# Patient Record
Sex: MALE | Race: BLACK OR AFRICAN AMERICAN | NOT HISPANIC OR LATINO | Employment: PART TIME | URBAN - METROPOLITAN AREA
[De-identification: names, ages, dates, MRNs, and addresses within clinical notes are randomized per-mention and may not be internally consistent; named-entity substitution may affect disease eponyms.]

---

## 2017-02-07 ENCOUNTER — HISTORICAL (OUTPATIENT)
Dept: RADIOLOGY | Facility: HOSPITAL | Age: 52
End: 2017-02-07

## 2017-05-09 ENCOUNTER — HISTORICAL (OUTPATIENT)
Dept: ADMINISTRATIVE | Facility: HOSPITAL | Age: 52
End: 2017-05-09

## 2017-05-09 LAB
ABS NEUT (OLG): 6.25 X10(3)/MCL (ref 2.1–9.2)
ABS NEUT (OLG): 6.4 X10(3)/MCL (ref 2.1–9.2)
ALBUMIN SERPL-MCNC: 4.7 GM/DL (ref 3.4–5)
ALBUMIN/GLOB SERPL: 1 {RATIO}
ALP SERPL-CCNC: 72 UNIT/L (ref 20–120)
ALT SERPL-CCNC: 13 UNIT/L
AST SERPL-CCNC: 18 UNIT/L
BASOPHILS # BLD AUTO: 0.04 X10(3)/MCL
BASOPHILS # BLD AUTO: 0.04 X10(3)/MCL
BASOPHILS NFR BLD AUTO: 0 % (ref 0–1)
BASOPHILS NFR BLD AUTO: 0 % (ref 0–1)
BILIRUB SERPL-MCNC: 0.8 MG/DL
BILIRUBIN DIRECT+TOT PNL SERPL-MCNC: <0.1 MG/DL
BILIRUBIN DIRECT+TOT PNL SERPL-MCNC: >0.7 MG/DL
BUN SERPL-MCNC: 41 MG/DL (ref 7–25)
CALCIUM SERPL-MCNC: 9.6 MG/DL (ref 8.4–10.3)
CD3+CD4+ CELLS # SPEC: 283 UNIT/L (ref 589–1505)
CD3+CD4+ CELLS NFR BLD: 11.8 % (ref 31–59)
CHLORIDE SERPL-SCNC: 106 MMOL/L (ref 96–110)
CO2 SERPL-SCNC: 21 MMOL/L (ref 24–32)
CREAT SERPL-MCNC: 3.02 MG/DL (ref 0.7–1.4)
EOSINOPHIL # BLD AUTO: 0.22 10*3/UL
EOSINOPHIL # BLD AUTO: 0.23 X10(3)/MCL
EOSINOPHIL NFR BLD AUTO: 2 % (ref 0–5)
EOSINOPHIL NFR BLD AUTO: 2 % (ref 0–5)
ERYTHROCYTE [DISTWIDTH] IN BLOOD BY AUTOMATED COUNT: 15 % (ref 11.5–14.5)
ERYTHROCYTE [DISTWIDTH] IN BLOOD BY AUTOMATED COUNT: 15 % (ref 11.5–14.5)
GLOBULIN SER-MCNC: 4 GM/ML (ref 2.3–3.5)
GLUCOSE SERPL-MCNC: 88 MG/DL (ref 65–99)
HCT VFR BLD AUTO: 34 % (ref 40–51)
HCT VFR BLD AUTO: 34.1 % (ref 40–51)
HGB BLD-MCNC: 10.6 GM/DL (ref 13.5–17.5)
HGB BLD-MCNC: 10.9 GM/DL (ref 13.5–17.5)
IMM GRANULOCYTES # BLD AUTO: 0.02 10*3/UL
IMM GRANULOCYTES # BLD AUTO: 0.04 10*3/UL
IMM GRANULOCYTES NFR BLD AUTO: 0 %
IMM GRANULOCYTES NFR BLD AUTO: 0 %
LYMPHOCYTES # BLD AUTO: 2.38 X10(3)/MCL
LYMPHOCYTES # BLD AUTO: 2.43 X10(3)/MCL
LYMPHOCYTES # BLD AUTO: 2400 UNIT/L (ref 1260–5520)
LYMPHOCYTES NFR BLD AUTO: 24 % (ref 15–40)
LYMPHOCYTES NFR BLD AUTO: 24 % (ref 15–40)
LYMPHOCYTES NFR LN MANUAL: 24 % (ref 28–48)
LYMPHOMA - T-CELL MARKERS SPEC-IMP: ABNORMAL
MCH RBC QN AUTO: 25.9 PG (ref 26–34)
MCH RBC QN AUTO: 26.3 PG (ref 26–34)
MCHC RBC AUTO-ENTMCNC: 31.2 GM/DL (ref 31–37)
MCHC RBC AUTO-ENTMCNC: 32 GM/DL (ref 31–37)
MCV RBC AUTO: 82.4 FL (ref 80–100)
MCV RBC AUTO: 82.9 FL (ref 80–100)
MONOCYTES # BLD AUTO: 0.92 X10(3)/MCL
MONOCYTES # BLD AUTO: 0.93 X10(3)/MCL
MONOCYTES NFR BLD AUTO: 9 % (ref 4–12)
MONOCYTES NFR BLD AUTO: 9 % (ref 4–12)
NEUTROPHILS # BLD AUTO: 6.25 X10(3)/MCL
NEUTROPHILS # BLD AUTO: 6.4 X10(3)/MCL
NEUTROPHILS NFR BLD AUTO: 64 X10(3)/MCL
NEUTROPHILS NFR BLD AUTO: 64 X10(3)/MCL
PLATELET # BLD AUTO: 265 X10(3)/MCL (ref 130–400)
PLATELET # BLD AUTO: 284 X10(3)/MCL (ref 130–400)
PMV BLD AUTO: 10.6 FL (ref 7.4–10.4)
PMV BLD AUTO: 10.8 FL (ref 7.4–10.4)
POTASSIUM SERPL-SCNC: 4.4 MMOL/L (ref 3.6–5.2)
PROT SERPL-MCNC: 8.7 GM/DL (ref 6–8)
RBC # BLD AUTO: 4.1 X10(6)/MCL (ref 4.5–5.9)
RBC # BLD AUTO: 4.14 X10(6)/MCL (ref 4.5–5.9)
SODIUM SERPL-SCNC: 135 MMOL/L (ref 135–146)
WBC # BLD AUTO: ABNORMAL /MM3 (ref 4500–11500)
WBC # SPEC AUTO: 10 X10(3)/MCL (ref 4.5–11)
WBC # SPEC AUTO: 9.9 X10(3)/MCL (ref 4.5–11)

## 2017-12-20 ENCOUNTER — HISTORICAL (OUTPATIENT)
Dept: ADMINISTRATIVE | Facility: HOSPITAL | Age: 52
End: 2017-12-20

## 2017-12-20 LAB
ABS NEUT (OLG): 4.45 X10(3)/MCL (ref 2.1–9.2)
ABS NEUT (OLG): 4.45 X10(3)/MCL (ref 2.1–9.2)
ALBUMIN SERPL-MCNC: 4 GM/DL (ref 3.4–5)
ALBUMIN/GLOB SERPL: 1 RATIO (ref 1–2)
ALP SERPL-CCNC: 101 UNIT/L (ref 45–117)
ALT SERPL-CCNC: 20 UNIT/L (ref 12–78)
APPEARANCE, UA: CLEAR
AST SERPL-CCNC: 15 UNIT/L (ref 15–37)
BACTERIA #/AREA URNS AUTO: ABNORMAL /[HPF]
BASOPHILS # BLD AUTO: 0.02 X10(3)/MCL
BASOPHILS # BLD AUTO: 0.03 X10(3)/MCL
BASOPHILS NFR BLD AUTO: 0 % (ref 0–1)
BASOPHILS NFR BLD AUTO: 0 % (ref 0–1)
BILIRUB SERPL-MCNC: 0.3 MG/DL (ref 0.2–1)
BILIRUB UR QL STRIP: NEGATIVE
BILIRUBIN DIRECT+TOT PNL SERPL-MCNC: 0.1 MG/DL
BILIRUBIN DIRECT+TOT PNL SERPL-MCNC: 0.2 MG/DL
BUN SERPL-MCNC: 56 MG/DL (ref 7–18)
CALCIUM SERPL-MCNC: 9.3 MG/DL (ref 8.5–10.1)
CD3+CD4+ CELLS # SPEC: 318 UNIT/L (ref 589–1505)
CD3+CD4+ CELLS NFR BLD: 11.6 % (ref 31–59)
CHLORIDE SERPL-SCNC: 107 MMOL/L (ref 98–107)
CHOLEST SERPL-MCNC: 165 MG/DL
CHOLEST/HDLC SERPL: 4.1 {RATIO} (ref 0–5)
CO2 SERPL-SCNC: 23 MMOL/L (ref 21–32)
COLOR UR: YELLOW
CREAT SERPL-MCNC: 3 MG/DL (ref 0.6–1.3)
CREAT UR-MCNC: 275 MG/DL
DEPRECATED CALCIDIOL+CALCIFEROL SERPL-MC: 8.76 NG/ML (ref 30–80)
EOSINOPHIL # BLD AUTO: 0.2 10*3/UL
EOSINOPHIL # BLD AUTO: 0.24 X10(3)/MCL
EOSINOPHIL NFR BLD AUTO: 2 % (ref 0–5)
EOSINOPHIL NFR BLD AUTO: 3 % (ref 0–5)
ERYTHROCYTE [DISTWIDTH] IN BLOOD BY AUTOMATED COUNT: 15.7 % (ref 11.5–14.5)
ERYTHROCYTE [DISTWIDTH] IN BLOOD BY AUTOMATED COUNT: 15.7 % (ref 11.5–14.5)
EST. AVERAGE GLUCOSE BLD GHB EST-MCNC: 131 MG/DL
GLOBULIN SER-MCNC: 5.1 GM/ML (ref 2.3–3.5)
GLUCOSE (UA): NORMAL
GLUCOSE SERPL-MCNC: 109 MG/DL (ref 74–106)
HBA1C MFR BLD: 6.2 % (ref 4.2–6.3)
HCT VFR BLD AUTO: 41.1 % (ref 40–51)
HCT VFR BLD AUTO: 41.2 % (ref 40–51)
HDLC SERPL-MCNC: 40 MG/DL
HGB BLD-MCNC: 13.2 GM/DL (ref 13.5–17.5)
HGB BLD-MCNC: 13.3 GM/DL (ref 13.5–17.5)
HGB UR QL STRIP: NEGATIVE
HYALINE CASTS #/AREA URNS LPF: ABNORMAL /[LPF]
IMM GRANULOCYTES # BLD AUTO: 0.03 10*3/UL
IMM GRANULOCYTES # BLD AUTO: 0.04 10*3/UL
IMM GRANULOCYTES NFR BLD AUTO: 0 %
IMM GRANULOCYTES NFR BLD AUTO: 0 %
KETONES UR QL STRIP: NEGATIVE
LDLC SERPL CALC-MCNC: 73 MG/DL (ref 0–130)
LEUKOCYTE ESTERASE UR QL STRIP: 25 LEU/UL
LYMPHOCYTES # BLD AUTO: 2.76 X10(3)/MCL
LYMPHOCYTES # BLD AUTO: 2.81 X10(3)/MCL
LYMPHOCYTES # BLD AUTO: 2739 UNIT/L (ref 1260–5520)
LYMPHOCYTES NFR BLD AUTO: 33 % (ref 15–40)
LYMPHOCYTES NFR BLD AUTO: 34 % (ref 15–40)
LYMPHOCYTES NFR LN MANUAL: 33 % (ref 28–48)
LYMPHOMA - T-CELL MARKERS SPEC-IMP: ABNORMAL
MCH RBC QN AUTO: 24.3 PG (ref 26–34)
MCH RBC QN AUTO: 24.4 PG (ref 26–34)
MCHC RBC AUTO-ENTMCNC: 32 GM/DL (ref 31–37)
MCHC RBC AUTO-ENTMCNC: 32.4 GM/DL (ref 31–37)
MCV RBC AUTO: 75.4 FL (ref 80–100)
MCV RBC AUTO: 75.7 FL (ref 80–100)
MICROALBUMIN UR-MCNC: 1280 MG/L (ref 0–19)
MICROALBUMIN/CREAT RATIO PNL UR: 465.5 MCG/MG CR (ref 0–29)
MONOCYTES # BLD AUTO: 0.77 X10(3)/MCL
MONOCYTES # BLD AUTO: 0.82 X10(3)/MCL
MONOCYTES NFR BLD AUTO: 10 % (ref 4–12)
MONOCYTES NFR BLD AUTO: 9 % (ref 4–12)
MUCOUS THREADS URNS QL MICRO: ABNORMAL
NEG CONT SPOT COUNT: 0
NEUTROPHILS # BLD AUTO: 4.45 X10(3)/MCL
NEUTROPHILS # BLD AUTO: 4.45 X10(3)/MCL
NEUTROPHILS NFR BLD AUTO: 53 X10(3)/MCL
NEUTROPHILS NFR BLD AUTO: 54 X10(3)/MCL
NITRITE UR QL STRIP: NEGATIVE
PANEL A SPOT COUNT: 1
PANEL B SPOT COUNT: 0
PH UR STRIP: 5.5 [PH] (ref 4.5–8)
PLATELET # BLD AUTO: 289 X10(3)/MCL (ref 130–400)
PLATELET # BLD AUTO: 298 X10(3)/MCL (ref 130–400)
PMV BLD AUTO: 10.3 FL (ref 7.4–10.4)
PMV BLD AUTO: 11.4 FL (ref 7.4–10.4)
POS CONT SPOT COUNT: >20
POTASSIUM SERPL-SCNC: 5 MMOL/L (ref 3.5–5.1)
PROT SERPL-MCNC: 9.1 GM/DL (ref 6.4–8.2)
PROT UR QL STRIP: 300 MG/DL
RBC # BLD AUTO: 5.44 X10(6)/MCL (ref 4.5–5.9)
RBC # BLD AUTO: 5.45 X10(6)/MCL (ref 4.5–5.9)
RBC #/AREA URNS AUTO: ABNORMAL /[HPF]
RPR SER QL: NORMAL
SODIUM SERPL-SCNC: 139 MMOL/L (ref 136–145)
SP GR UR STRIP: 1.01 (ref 1–1.03)
SQUAMOUS #/AREA URNS LPF: ABNORMAL /[LPF]
T PALLIDUM AB SER QL: REACTIVE
T-SPOT.TB: NEGATIVE
TRIGL SERPL-MCNC: 259 MG/DL
TSH SERPL-ACNC: 0.69 MIU/L (ref 0.36–3.74)
UROBILINOGEN UR STRIP-ACNC: NORMAL
VLDLC SERPL CALC-MCNC: 52 MG/DL
WBC # BLD AUTO: 8300 /MM3 (ref 4500–11500)
WBC # SPEC AUTO: 8.3 X10(3)/MCL (ref 4.5–11)
WBC # SPEC AUTO: 8.3 X10(3)/MCL (ref 4.5–11)
WBC #/AREA URNS AUTO: ABNORMAL /HPF

## 2018-08-07 ENCOUNTER — HISTORICAL (OUTPATIENT)
Dept: ADMINISTRATIVE | Facility: HOSPITAL | Age: 53
End: 2018-08-07

## 2018-08-07 LAB
ABS NEUT (OLG): 3.2 X10(3)/MCL (ref 2.1–9.2)
ALBUMIN SERPL-MCNC: 4.1 GM/DL (ref 3.4–5)
ALBUMIN/GLOB SERPL: 1 RATIO (ref 1–2)
ALP SERPL-CCNC: 100 UNIT/L (ref 45–117)
ALT SERPL-CCNC: 20 UNIT/L (ref 12–78)
APPEARANCE, UA: CLEAR
AST SERPL-CCNC: 16 UNIT/L (ref 15–37)
BACTERIA #/AREA URNS AUTO: ABNORMAL /[HPF]
BASOPHILS # BLD AUTO: 0.03 X10(3)/MCL
BASOPHILS NFR BLD AUTO: 0 %
BILIRUB SERPL-MCNC: 0.3 MG/DL (ref 0.2–1)
BILIRUB UR QL STRIP: NEGATIVE
BILIRUBIN DIRECT+TOT PNL SERPL-MCNC: <0.1 MG/DL
BILIRUBIN DIRECT+TOT PNL SERPL-MCNC: >0.2 MG/DL
BUN SERPL-MCNC: 34 MG/DL (ref 7–18)
CALCIUM SERPL-MCNC: 9.1 MG/DL (ref 8.5–10.1)
CD3+CD4+ CELLS # SPEC: 277 UNIT/L (ref 589–1505)
CD3+CD4+ CELLS NFR BLD: 14.9 % (ref 31–59)
CHLORIDE SERPL-SCNC: 109 MMOL/L (ref 98–107)
CHOLEST SERPL-MCNC: 135 MG/DL
CHOLEST/HDLC SERPL: 4.5 {RATIO} (ref 0–5)
CO2 SERPL-SCNC: 26 MMOL/L (ref 21–32)
COLOR UR: YELLOW
CREAT SERPL-MCNC: 2.7 MG/DL (ref 0.6–1.3)
CREAT UR-MCNC: 226 MG/DL
DEPRECATED CALCIDIOL+CALCIFEROL SERPL-MC: 16.11 NG/ML (ref 30–80)
EOSINOPHIL # BLD AUTO: 0.2 X10(3)/MCL
EOSINOPHIL NFR BLD AUTO: 3 %
ERYTHROCYTE [DISTWIDTH] IN BLOOD BY AUTOMATED COUNT: 15.9 % (ref 11.5–14.5)
EST. AVERAGE GLUCOSE BLD GHB EST-MCNC: 140 MG/DL
GLOBULIN SER-MCNC: 4.4 GM/ML (ref 2.3–3.5)
GLUCOSE (UA): NORMAL
GLUCOSE SERPL-MCNC: 102 MG/DL (ref 74–106)
HBA1C MFR BLD: 6.5 % (ref 4.2–6.3)
HCT VFR BLD AUTO: 38.6 % (ref 40–51)
HDLC SERPL-MCNC: 30 MG/DL
HGB BLD-MCNC: 11.7 GM/DL (ref 13.5–17.5)
HGB UR QL STRIP: 0.06 MG/DL
HYALINE CASTS #/AREA URNS LPF: ABNORMAL /[LPF]
IMM GRANULOCYTES # BLD AUTO: 0.03 10*3/UL
IMM GRANULOCYTES NFR BLD AUTO: 0 %
KETONES UR QL STRIP: NEGATIVE
LDLC SERPL CALC-MCNC: 70 MG/DL (ref 0–130)
LEUKOCYTE ESTERASE UR QL STRIP: 75 LEU/UL
LYMPHOCYTES # BLD AUTO: 1.85 X10(3)/MCL
LYMPHOCYTES # BLD AUTO: 1860 UNIT/L (ref 1260–5520)
LYMPHOCYTES NFR BLD AUTO: 31 % (ref 13–40)
LYMPHOCYTES NFR LN MANUAL: 31 % (ref 28–48)
LYMPHOMA - T-CELL MARKERS SPEC-IMP: ABNORMAL
MCH RBC QN AUTO: 23.3 PG (ref 26–34)
MCHC RBC AUTO-ENTMCNC: 30.3 GM/DL (ref 31–37)
MCV RBC AUTO: 76.7 FL (ref 80–100)
MICROALBUMIN UR-MCNC: 560 MG/L (ref 0–19)
MICROALBUMIN/CREAT RATIO PNL UR: 247.8 MCG/MG CR (ref 0–29)
MONOCYTES # BLD AUTO: 0.7 X10(3)/MCL
MONOCYTES NFR BLD AUTO: 12 % (ref 4–12)
MUCOUS THREADS URNS QL MICRO: ABNORMAL
NEG CONT SPOT COUNT: NORMAL
NEUTROPHILS # BLD AUTO: 3.2 X10(3)/MCL
NEUTROPHILS NFR BLD AUTO: 53 X10(3)/MCL
NITRITE UR QL STRIP: NEGATIVE
PANEL A SPOT COUNT: 0
PANEL B SPOT COUNT: 0
PH UR STRIP: 5.5 [PH] (ref 4.5–8)
PLATELET # BLD AUTO: 194 X10(3)/MCL (ref 130–400)
PMV BLD AUTO: 10.8 FL (ref 7.4–10.4)
POS CONT SPOT COUNT: NORMAL
POTASSIUM SERPL-SCNC: 4.7 MMOL/L (ref 3.5–5.1)
PROT SERPL-MCNC: 8.5 GM/DL (ref 6.4–8.2)
PROT UR QL STRIP: 70 MG/DL
RBC # BLD AUTO: 5.03 X10(6)/MCL (ref 4.5–5.9)
RBC #/AREA URNS AUTO: ABNORMAL /[HPF]
RPR SER QL: REACTIVE
SODIUM SERPL-SCNC: 141 MMOL/L (ref 136–145)
SP GR UR STRIP: 1.02 (ref 1–1.03)
SQUAMOUS #/AREA URNS LPF: ABNORMAL /[LPF]
T PALLIDUM AB SER QL: REACTIVE
T-SPOT.TB: NEGATIVE
TRIGL SERPL-MCNC: 176 MG/DL
TSH SERPL-ACNC: 1.97 MIU/L (ref 0.36–3.74)
UROBILINOGEN UR STRIP-ACNC: NORMAL
VLDLC SERPL CALC-MCNC: 35 MG/DL
WBC # BLD AUTO: 6000 /MM3 (ref 4500–11500)
WBC # SPEC AUTO: 6 X10(3)/MCL (ref 4.5–11)
WBC #/AREA URNS AUTO: ABNORMAL /HPF

## 2018-09-28 ENCOUNTER — HISTORICAL (OUTPATIENT)
Dept: RADIOLOGY | Facility: HOSPITAL | Age: 53
End: 2018-09-28

## 2018-10-10 ENCOUNTER — HISTORICAL (OUTPATIENT)
Dept: ADMINISTRATIVE | Facility: HOSPITAL | Age: 53
End: 2018-10-10

## 2018-10-10 LAB
ABS NEUT (OLG): 5.46 X10(3)/MCL (ref 2.1–9.2)
ALBUMIN SERPL-MCNC: 4.3 GM/DL (ref 3.4–5)
ALBUMIN/GLOB SERPL: 1 RATIO (ref 1–2)
ALP SERPL-CCNC: 87 UNIT/L (ref 45–117)
ALT SERPL-CCNC: 25 UNIT/L (ref 12–78)
APPEARANCE, UA: CLEAR
AST SERPL-CCNC: 19 UNIT/L (ref 15–37)
BACTERIA #/AREA URNS AUTO: ABNORMAL /[HPF]
BASOPHILS # BLD AUTO: 0.05 X10(3)/MCL
BASOPHILS NFR BLD AUTO: 1 %
BILIRUB SERPL-MCNC: 0.2 MG/DL (ref 0.2–1)
BILIRUB UR QL STRIP: NEGATIVE
BILIRUBIN DIRECT+TOT PNL SERPL-MCNC: <0.1 MG/DL
BILIRUBIN DIRECT+TOT PNL SERPL-MCNC: ABNORMAL MG/DL
BUN SERPL-MCNC: 48 MG/DL (ref 7–18)
CALCIUM SERPL-MCNC: 9.3 MG/DL (ref 8.5–10.1)
CHLORIDE SERPL-SCNC: 109 MMOL/L (ref 98–107)
CO2 SERPL-SCNC: 23 MMOL/L (ref 21–32)
COLOR UR: ABNORMAL
CREAT SERPL-MCNC: 3.7 MG/DL (ref 0.6–1.3)
CREAT UR-MCNC: 193 MG/DL
DEPRECATED CALCIDIOL+CALCIFEROL SERPL-MC: 37.24 NG/ML (ref 30–80)
EOSINOPHIL # BLD AUTO: 0.18 10*3/UL
EOSINOPHIL NFR BLD AUTO: 2 %
ERYTHROCYTE [DISTWIDTH] IN BLOOD BY AUTOMATED COUNT: 14.6 % (ref 11.5–14.5)
FERRITIN SERPL-MCNC: 151.1 NG/ML (ref 26–388)
GLOBULIN SER-MCNC: 4.4 GM/ML (ref 2.3–3.5)
GLUCOSE (UA): NORMAL
GLUCOSE SERPL-MCNC: 110 MG/DL (ref 74–106)
HCT VFR BLD AUTO: 34 % (ref 40–51)
HGB BLD-MCNC: 10.4 GM/DL (ref 13.5–17.5)
HGB UR QL STRIP: NEGATIVE
HYALINE CASTS #/AREA URNS LPF: ABNORMAL /[LPF]
IMM GRANULOCYTES # BLD AUTO: 0.03 10*3/UL
IMM GRANULOCYTES NFR BLD AUTO: 0 %
IRON SATN MFR SERPL: 24.8 % (ref 15–50)
IRON SERPL-MCNC: 71 MCG/DL (ref 65–175)
KETONES UR QL STRIP: NEGATIVE
LEUKOCYTE ESTERASE UR QL STRIP: 250 LEU/UL
LYMPHOCYTES # BLD AUTO: 2.49 X10(3)/MCL
LYMPHOCYTES NFR BLD AUTO: 28 % (ref 13–40)
MAGNESIUM SERPL-MCNC: 2.4 MG/DL (ref 1.8–2.4)
MCH RBC QN AUTO: 23.1 PG (ref 26–34)
MCHC RBC AUTO-ENTMCNC: 30.6 GM/DL (ref 31–37)
MCV RBC AUTO: 75.6 FL (ref 80–100)
MONOCYTES # BLD AUTO: 0.8 X10(3)/MCL
MONOCYTES NFR BLD AUTO: 9 % (ref 4–12)
NEUTROPHILS # BLD AUTO: 5.46 X10(3)/MCL
NEUTROPHILS NFR BLD AUTO: 61 X10(3)/MCL
NITRITE UR QL STRIP: NEGATIVE
PH UR STRIP: 5.5 [PH] (ref 4.5–8)
PHOSPHATE SERPL-MCNC: 2.6 MG/DL (ref 2.5–4.9)
PLATELET # BLD AUTO: 194 X10(3)/MCL (ref 130–400)
PMV BLD AUTO: 10.4 FL (ref 7.4–10.4)
POTASSIUM SERPL-SCNC: 4.6 MMOL/L (ref 3.5–5.1)
PROT SERPL-MCNC: 8.2 GM/DL
PROT SERPL-MCNC: 8.7 GM/DL (ref 6.4–8.2)
PROT UR QL STRIP: NEGATIVE
PROT UR STRIP-MCNC: 13.5 MG/DL
PROT/CREAT UR-RTO: 69.9 MG/GM
PTH-INTACT SERPL-MCNC: 111.9 PG/ML (ref 18.4–80.1)
RBC # BLD AUTO: 4.5 X10(6)/MCL (ref 4.5–5.9)
RBC #/AREA URNS AUTO: ABNORMAL /[HPF]
SODIUM SERPL-SCNC: 139 MMOL/L (ref 136–145)
SP GR UR STRIP: 1.01 (ref 1–1.03)
SQUAMOUS #/AREA URNS LPF: ABNORMAL /[LPF]
TIBC SERPL-MCNC: 286 MCG/DL (ref 250–450)
TRANSFERRIN SERPL-MCNC: 240 MG/DL (ref 200–360)
UROBILINOGEN UR STRIP-ACNC: NORMAL
WBC # SPEC AUTO: 9 X10(3)/MCL (ref 4.5–11)
WBC #/AREA URNS AUTO: ABNORMAL /HPF

## 2019-05-23 ENCOUNTER — HISTORICAL (OUTPATIENT)
Dept: ADMINISTRATIVE | Facility: HOSPITAL | Age: 54
End: 2019-05-23

## 2019-05-23 LAB
ABS NEUT (OLG): 5.11 X10(3)/MCL (ref 2.1–9.2)
ALBUMIN SERPL-MCNC: 3.9 GM/DL (ref 3.4–5)
ALBUMIN/GLOB SERPL: 1 RATIO (ref 1.1–2)
ALP SERPL-CCNC: 93 UNIT/L (ref 45–117)
ALT SERPL-CCNC: 21 UNIT/L (ref 12–78)
APPEARANCE, UA: CLEAR
AST SERPL-CCNC: 15 UNIT/L (ref 15–37)
BACTERIA #/AREA URNS AUTO: ABNORMAL /[HPF]
BASOPHILS # BLD AUTO: 0.04 X10(3)/MCL
BASOPHILS NFR BLD AUTO: 0 %
BILIRUB SERPL-MCNC: 0.3 MG/DL (ref 0.2–1)
BILIRUB UR QL STRIP: NEGATIVE
BILIRUBIN DIRECT+TOT PNL SERPL-MCNC: 0.1 MG/DL
BILIRUBIN DIRECT+TOT PNL SERPL-MCNC: 0.2 MG/DL
BUN SERPL-MCNC: 46 MG/DL (ref 7–18)
CALCIUM SERPL-MCNC: 9.2 MG/DL (ref 8.5–10.1)
CD3+CD4+ CELLS # SPEC: 455 UNIT/L (ref 589–1505)
CD3+CD4+ CELLS NFR BLD: 14.8 % (ref 31–59)
CHLORIDE SERPL-SCNC: 106 MMOL/L (ref 98–107)
CHOLEST SERPL-MCNC: 131 MG/DL
CHOLEST/HDLC SERPL: 3.7 {RATIO} (ref 0–5)
CO2 SERPL-SCNC: 23 MMOL/L (ref 21–32)
COLOR UR: ABNORMAL
CREAT SERPL-MCNC: 3.3 MG/DL (ref 0.6–1.3)
DEPRECATED CALCIDIOL+CALCIFEROL SERPL-MC: 59.44 NG/ML (ref 30–80)
EOSINOPHIL # BLD AUTO: 0.24 X10(3)/MCL
EOSINOPHIL NFR BLD AUTO: 2 %
ERYTHROCYTE [DISTWIDTH] IN BLOOD BY AUTOMATED COUNT: 15.5 % (ref 11.5–14.5)
EST. AVERAGE GLUCOSE BLD GHB EST-MCNC: 146 MG/DL
GLOBULIN SER-MCNC: 4.1 GM/ML (ref 2.3–3.5)
GLUCOSE (UA): NORMAL
GLUCOSE SERPL-MCNC: 98 MG/DL (ref 74–106)
HBA1C MFR BLD: 6.7 % (ref 4.2–6.3)
HCT VFR BLD AUTO: 35.3 % (ref 40–51)
HDLC SERPL-MCNC: 35 MG/DL
HGB BLD-MCNC: 11.2 GM/DL (ref 13.5–17.5)
HGB UR QL STRIP: 0.03 MG/DL
HYALINE CASTS #/AREA URNS LPF: ABNORMAL /[LPF]
IMM GRANULOCYTES # BLD AUTO: 0.03 10*3/UL
IMM GRANULOCYTES NFR BLD AUTO: 0 %
KETONES UR QL STRIP: NEGATIVE
LDLC SERPL CALC-MCNC: 56 MG/DL (ref 0–130)
LEUKOCYTE ESTERASE UR QL STRIP: 250 LEU/UL
LYMPHOCYTES # BLD AUTO: 3.04 X10(3)/MCL
LYMPHOCYTES # BLD AUTO: 3072 UNIT/L (ref 1260–5520)
LYMPHOCYTES NFR BLD AUTO: 32 % (ref 13–40)
LYMPHOCYTES NFR LN MANUAL: 32 % (ref 28–48)
LYMPHOMA - T-CELL MARKERS SPEC-IMP: ABNORMAL
MCH RBC QN AUTO: 23.4 PG (ref 26–34)
MCHC RBC AUTO-ENTMCNC: 31.7 GM/DL (ref 31–37)
MCV RBC AUTO: 73.8 FL (ref 80–100)
MONOCYTES # BLD AUTO: 1.09 X10(3)/MCL
MONOCYTES NFR BLD AUTO: 11 % (ref 4–12)
NEG CONT SPOT COUNT: NORMAL
NEUTROPHILS # BLD AUTO: 5.11 X10(3)/MCL
NEUTROPHILS NFR BLD AUTO: 54 X10(3)/MCL
NITRITE UR QL STRIP: NEGATIVE
PANEL A SPOT COUNT: 0
PANEL B SPOT COUNT: 0
PH UR STRIP: 5.5 [PH] (ref 4.5–8)
PLATELET # BLD AUTO: 228 X10(3)/MCL (ref 130–400)
PMV BLD AUTO: 10.1 FL (ref 7.4–10.4)
POS CONT SPOT COUNT: NORMAL
POTASSIUM SERPL-SCNC: 4.4 MMOL/L (ref 3.5–5.1)
PROT SERPL-MCNC: 8 GM/DL (ref 6.4–8.2)
PROT UR QL STRIP: 200 MG/DL
RBC # BLD AUTO: 4.78 X10(6)/MCL (ref 4.5–5.9)
RBC #/AREA URNS AUTO: ABNORMAL /[HPF]
RPR SER QL: NORMAL
SODIUM SERPL-SCNC: 137 MMOL/L (ref 136–145)
SP GR UR STRIP: 1.01 (ref 1–1.03)
SQUAMOUS #/AREA URNS LPF: ABNORMAL /[LPF]
T PALLIDUM AB SER QL: REACTIVE
T-SPOT.TB: NORMAL
TRIGL SERPL-MCNC: 199 MG/DL
TSH SERPL-ACNC: 1.09 MIU/L (ref 0.36–3.74)
UROBILINOGEN UR STRIP-ACNC: NORMAL
VLDLC SERPL CALC-MCNC: 40 MG/DL
WBC # BLD AUTO: 9600 /MM3 (ref 4500–11500)
WBC # SPEC AUTO: 9.6 X10(3)/MCL (ref 4.5–11)
WBC #/AREA URNS AUTO: ABNORMAL /HPF

## 2019-06-06 ENCOUNTER — HISTORICAL (OUTPATIENT)
Dept: ADMINISTRATIVE | Facility: HOSPITAL | Age: 54
End: 2019-06-06

## 2019-08-23 ENCOUNTER — HISTORICAL (OUTPATIENT)
Dept: ADMINISTRATIVE | Facility: HOSPITAL | Age: 54
End: 2019-08-23

## 2019-08-23 LAB
ABS NEUT (OLG): 5.59 X10(3)/MCL
ALBUMIN SERPL-MCNC: 4.4 GM/DL (ref 3.4–5)
ALBUMIN/GLOB SERPL: 1 RATIO (ref 1.1–2)
ALP SERPL-CCNC: 94 UNIT/L (ref 45–117)
ALT SERPL-CCNC: 26 UNIT/L (ref 12–78)
ANISOCYTOSIS BLD QL SMEAR: NORMAL
APPEARANCE, UA: CLEAR
AST SERPL-CCNC: 13 UNIT/L (ref 15–37)
BACTERIA #/AREA URNS AUTO: ABNORMAL /[HPF]
BASOPHILS # BLD AUTO: 0.05 X10(3)/MCL
BASOPHILS NFR BLD AUTO: 0 %
BILIRUB SERPL-MCNC: 0.3 MG/DL (ref 0.2–1)
BILIRUB UR QL STRIP: NEGATIVE
BILIRUBIN DIRECT+TOT PNL SERPL-MCNC: <0.1 MG/DL
BILIRUBIN DIRECT+TOT PNL SERPL-MCNC: ABNORMAL MG/DL
BUN SERPL-MCNC: 33 MG/DL (ref 7–18)
CALCIUM SERPL-MCNC: 9.9 MG/DL (ref 8.5–10.1)
CD3+CD4+ CELLS # SPEC: 463 UNIT/L (ref 589–1505)
CD3+CD4+ CELLS NFR BLD: 15.1 % (ref 31–59)
CHLORIDE SERPL-SCNC: 107 MMOL/L (ref 98–107)
CO2 SERPL-SCNC: 28 MMOL/L (ref 21–32)
COLOR UR: ABNORMAL
CREAT SERPL-MCNC: 3.2 MG/DL (ref 0.6–1.3)
CREAT UR-MCNC: 203 MG/DL
DACRYOCYTES BLD QL SMEAR: NORMAL
EOSINOPHIL # BLD AUTO: 0.32 X10(3)/MCL
EOSINOPHIL NFR BLD AUTO: 3 %
ERYTHROCYTE [DISTWIDTH] IN BLOOD BY AUTOMATED COUNT: 15.2 % (ref 11.5–14.5)
EST. AVERAGE GLUCOSE BLD GHB EST-MCNC: 128 MG/DL
GLOBULIN SER-MCNC: 4.6 GM/ML (ref 2.3–3.5)
GLUCOSE (UA): NORMAL
GLUCOSE SERPL-MCNC: 93 MG/DL (ref 74–106)
HBA1C MFR BLD: 6.1 % (ref 4.2–6.3)
HCT VFR BLD AUTO: 42.5 % (ref 40–51)
HGB BLD-MCNC: 12.7 GM/DL (ref 13.5–17.5)
HGB UR QL STRIP: 0.06 MG/DL
HYALINE CASTS #/AREA URNS LPF: ABNORMAL /[LPF]
IMM GRANULOCYTES # BLD AUTO: 0.05 10*3/UL
IMM GRANULOCYTES NFR BLD AUTO: 0 %
KETONES UR QL STRIP: NEGATIVE
LEUKOCYTE ESTERASE UR QL STRIP: 75 LEU/UL
LYMPHOCYTES # BLD AUTO: 3.04 X10(3)/MCL
LYMPHOCYTES # BLD AUTO: 3069 UNIT/L (ref 1260–5520)
LYMPHOCYTES NFR BLD AUTO: 31 % (ref 13–40)
LYMPHOCYTES NFR LN MANUAL: 31 % (ref 28–48)
LYMPHOMA - T-CELL MARKERS SPEC-IMP: ABNORMAL
MACROCYTES BLD QL SMEAR: NORMAL
MCH RBC QN AUTO: 22.4 PG (ref 26–34)
MCHC RBC AUTO-ENTMCNC: 29.9 GM/DL (ref 31–37)
MCV RBC AUTO: 75 FL (ref 80–100)
MICROALBUMIN UR-MCNC: 1300 MG/L (ref 0–19)
MICROALBUMIN/CREAT RATIO PNL UR: 640.4 MCG/MG CR (ref 0–29)
MONOCYTES # BLD AUTO: 0.83 X10(3)/MCL
MONOCYTES NFR BLD AUTO: 8 % (ref 0–24)
NEUTROPHILS # BLD AUTO: 5.59 X10(3)/MCL
NEUTROPHILS NFR BLD AUTO: 57 %
NITRITE UR QL STRIP: NEGATIVE
OVALOCYTES BLD QL SMEAR: NORMAL
PH UR STRIP: 6 [PH] (ref 4.5–8)
PLATELET # BLD AUTO: 225 X10(3)/MCL (ref 130–400)
PLATELET # BLD EST: ADEQUATE 10*3/UL
PMV BLD AUTO: 10.3 FL (ref 7.4–10.4)
POIKILOCYTOSIS BLD QL SMEAR: NORMAL
POTASSIUM SERPL-SCNC: 4.7 MMOL/L (ref 3.5–5.1)
PROT SERPL-MCNC: 9 GM/DL (ref 6.4–8.2)
PROT UR QL STRIP: 200 MG/DL
PSA SERPL-MCNC: 2.5 NG/ML
RBC # BLD AUTO: 5.67 X10(6)/MCL (ref 4.5–5.9)
RBC #/AREA URNS AUTO: ABNORMAL /[HPF]
RBC MORPH BLD: NORMAL
SCHISTOCYTES BLD QL AUTO: NORMAL
SODIUM SERPL-SCNC: 139 MMOL/L (ref 136–145)
SP GR UR STRIP: 1.01 (ref 1–1.03)
SQUAMOUS #/AREA URNS LPF: ABNORMAL /[LPF]
UROBILINOGEN UR STRIP-ACNC: NORMAL
WBC # BLD AUTO: 9900 /MM3 (ref 4500–11500)
WBC # SPEC AUTO: 9.9 X10(3)/MCL (ref 4.5–11)
WBC #/AREA URNS AUTO: ABNORMAL /HPF

## 2020-02-19 ENCOUNTER — HISTORICAL (OUTPATIENT)
Dept: ADMINISTRATIVE | Facility: HOSPITAL | Age: 55
End: 2020-02-19

## 2020-02-19 LAB
ABS NEUT (OLG): 5.21 X10(3)/MCL (ref 2.1–9.2)
ALBUMIN SERPL-MCNC: 4.2 GM/DL (ref 3.4–5)
ALBUMIN/GLOB SERPL: 0.9 RATIO (ref 1.1–2)
ALP SERPL-CCNC: 85 UNIT/L (ref 45–117)
ALT SERPL-CCNC: 21 UNIT/L (ref 12–78)
APPEARANCE, UA: CLEAR
AST SERPL-CCNC: 13 UNIT/L (ref 15–37)
BACTERIA #/AREA URNS AUTO: ABNORMAL /HPF
BASOPHILS # BLD AUTO: 0 X10(3)/MCL (ref 0–0.2)
BASOPHILS NFR BLD AUTO: 0 %
BILIRUB SERPL-MCNC: 0.3 MG/DL (ref 0.2–1)
BILIRUB UR QL STRIP: NEGATIVE
BILIRUBIN DIRECT+TOT PNL SERPL-MCNC: <0.1 MG/DL (ref 0–0.2)
BILIRUBIN DIRECT+TOT PNL SERPL-MCNC: ABNORMAL MG/DL
BUN SERPL-MCNC: 43 MG/DL (ref 7–18)
CALCIUM SERPL-MCNC: 9.4 MG/DL (ref 8.5–10.1)
CD3+CD4+ CELLS # SPEC: 507 UNIT/L (ref 589–1505)
CD3+CD4+ CELLS NFR BLD: 17.4 % (ref 31–59)
CHLORIDE SERPL-SCNC: 111 MMOL/L (ref 98–107)
CO2 SERPL-SCNC: 22 MMOL/L (ref 21–32)
COLOR UR: ABNORMAL
CREAT SERPL-MCNC: 3.1 MG/DL (ref 0.6–1.3)
CREAT UR-MCNC: 110 MG/DL
EOSINOPHIL # BLD AUTO: 0.3 X10(3)/MCL (ref 0–0.9)
EOSINOPHIL NFR BLD AUTO: 3 %
ERYTHROCYTE [DISTWIDTH] IN BLOOD BY AUTOMATED COUNT: 16.3 % (ref 11.5–14.5)
GLOBULIN SER-MCNC: 4.6 GM/ML (ref 2.3–3.5)
GLUCOSE (UA): NEGATIVE
GLUCOSE SERPL-MCNC: 93 MG/DL (ref 74–106)
HCT VFR BLD AUTO: 40.1 % (ref 40–51)
HGB BLD-MCNC: 12.1 GM/DL (ref 13.5–17.5)
HGB UR QL STRIP: 0.1
HYALINE CASTS #/AREA URNS LPF: ABNORMAL /LPF
IMM GRANULOCYTES # BLD AUTO: 0.03 10*3/UL
IMM GRANULOCYTES NFR BLD AUTO: 0 %
KETONES UR QL STRIP: NEGATIVE
LEUKOCYTE ESTERASE UR QL STRIP: 25 LEU/UL
LYMPHOCYTES # BLD AUTO: 2.9 X10(3)/MCL (ref 0.6–4.6)
LYMPHOCYTES # BLD AUTO: 2912 UNIT/L (ref 1260–5520)
LYMPHOCYTES NFR BLD AUTO: 32 %
LYMPHOCYTES NFR LN MANUAL: 32 % (ref 28–48)
LYMPHOMA - T-CELL MARKERS SPEC-IMP: ABNORMAL
MCH RBC QN AUTO: 22.5 PG (ref 26–34)
MCHC RBC AUTO-ENTMCNC: 30.2 GM/DL (ref 31–37)
MCV RBC AUTO: 74.5 FL (ref 80–100)
MICROALBUMIN UR-MCNC: 3000 MG/L (ref 0–19)
MICROALBUMIN/CREAT RATIO PNL UR: 2727.3 MCG/MG CR (ref 0–29)
MONOCYTES # BLD AUTO: 0.7 X10(3)/MCL (ref 0.1–1.3)
MONOCYTES NFR BLD AUTO: 8 %
NEUTROPHILS # BLD AUTO: 5.21 X10(3)/MCL (ref 2.1–9.2)
NEUTROPHILS NFR BLD AUTO: 57 %
NITRITE UR QL STRIP: NEGATIVE
PH UR STRIP: 6 [PH] (ref 4.5–8)
PLATELET # BLD AUTO: 267 X10(3)/MCL (ref 130–400)
PMV BLD AUTO: 9.9 FL (ref 7.4–10.4)
POTASSIUM SERPL-SCNC: 4.8 MMOL/L (ref 3.5–5.1)
PROT SERPL-MCNC: 8.8 GM/DL (ref 6.4–8.2)
PROT UR QL STRIP: 300 MG/DL
RBC # BLD AUTO: 5.38 X10(6)/MCL (ref 4.5–5.9)
RBC #/AREA URNS AUTO: ABNORMAL /HPF
SODIUM SERPL-SCNC: 141 MMOL/L (ref 136–145)
SP GR UR STRIP: 1.01 (ref 1–1.03)
SQUAMOUS #/AREA URNS LPF: ABNORMAL /LPF
UROBILINOGEN UR STRIP-ACNC: NORMAL
WBC # BLD AUTO: 9100 /MM3 (ref 4500–11500)
WBC # SPEC AUTO: 9.1 X10(3)/MCL (ref 4.5–11)
WBC #/AREA URNS AUTO: ABNORMAL /HPF

## 2020-07-17 ENCOUNTER — HISTORICAL (OUTPATIENT)
Dept: NEPHROLOGY | Facility: CLINIC | Age: 55
End: 2020-07-17

## 2020-07-17 LAB
ABS NEUT (OLG): 5.03 X10(3)/MCL (ref 2.1–9.2)
ALBUMIN SERPL-MCNC: 4.4 GM/DL (ref 3.4–5)
ALBUMIN/GLOB SERPL: 1 RATIO (ref 1.1–2)
ALP SERPL-CCNC: 74 UNIT/L (ref 45–117)
ALT SERPL-CCNC: 20 UNIT/L (ref 12–78)
APPEARANCE, UA: CLEAR
AST SERPL-CCNC: 12 UNIT/L (ref 15–37)
BACTERIA #/AREA URNS AUTO: ABNORMAL /HPF
BASOPHILS # BLD AUTO: 0.1 X10(3)/MCL (ref 0–0.2)
BASOPHILS NFR BLD AUTO: 1 %
BILIRUB SERPL-MCNC: 0.4 MG/DL (ref 0.2–1)
BILIRUB UR QL STRIP: NEGATIVE
BILIRUBIN DIRECT+TOT PNL SERPL-MCNC: 0.1 MG/DL (ref 0–0.2)
BILIRUBIN DIRECT+TOT PNL SERPL-MCNC: 0.3 MG/DL
BUN SERPL-MCNC: 43 MG/DL (ref 7–18)
CALCIUM SERPL-MCNC: 9.6 MG/DL (ref 8.5–10.1)
CHLORIDE SERPL-SCNC: 108 MMOL/L (ref 98–107)
CHOLEST SERPL-MCNC: 157 MG/DL
CHOLEST/HDLC SERPL: 3.7 {RATIO} (ref 0–5)
CO2 SERPL-SCNC: 23 MMOL/L (ref 21–32)
COLOR UR: ABNORMAL
CREAT SERPL-MCNC: 3.6 MG/DL (ref 0.6–1.3)
CREAT UR-MCNC: 170 MG/DL
DEPRECATED CALCIDIOL+CALCIFEROL SERPL-MC: 35.8 NG/ML (ref 30–80)
DEPRECATED CALCIDIOL+CALCIFEROL SERPL-MC: 35.8 NG/ML (ref 30–80)
EOSINOPHIL # BLD AUTO: 0.2 X10(3)/MCL (ref 0–0.9)
EOSINOPHIL NFR BLD AUTO: 2 %
ERYTHROCYTE [DISTWIDTH] IN BLOOD BY AUTOMATED COUNT: 15.9 % (ref 11.5–14.5)
EST. AVERAGE GLUCOSE BLD GHB EST-MCNC: 128 MG/DL
FERRITIN SERPL-MCNC: 130.1 NG/ML (ref 26–388)
GLOBULIN SER-MCNC: 4.6 GM/ML (ref 2.3–3.5)
GLUCOSE (UA): NEGATIVE
GLUCOSE SERPL-MCNC: 110 MG/DL (ref 74–106)
HBA1C MFR BLD: 6.1 % (ref 4.2–6.3)
HCT VFR BLD AUTO: 38.6 % (ref 40–51)
HCV AB SERPL QL IA: REACTIVE
HDLC SERPL-MCNC: 43 MG/DL (ref 40–59)
HGB BLD-MCNC: 11.7 GM/DL (ref 13.5–17.5)
HGB UR QL STRIP: NEGATIVE
HYALINE CASTS #/AREA URNS LPF: ABNORMAL /LPF
IMM GRANULOCYTES # BLD AUTO: 0.02 10*3/UL
IMM GRANULOCYTES NFR BLD AUTO: 0 %
IRON SATN MFR SERPL: 25.1 % (ref 15–50)
IRON SERPL-MCNC: 78 MCG/DL (ref 65–175)
KETONES UR QL STRIP: NEGATIVE
LDLC SERPL CALC-MCNC: 85 MG/DL
LEUKOCYTE ESTERASE UR QL STRIP: NEGATIVE
LYMPHOCYTES # BLD AUTO: 2.7 X10(3)/MCL (ref 0.6–4.6)
LYMPHOCYTES NFR BLD AUTO: 31 %
MAGNESIUM SERPL-MCNC: 2.5 MG/DL (ref 1.6–2.6)
MCH RBC QN AUTO: 22.5 PG (ref 26–34)
MCHC RBC AUTO-ENTMCNC: 30.3 GM/DL (ref 31–37)
MCV RBC AUTO: 74.2 FL (ref 80–100)
MONOCYTES # BLD AUTO: 0.8 X10(3)/MCL (ref 0.1–1.3)
MONOCYTES NFR BLD AUTO: 9 %
NEG CONT SPOT COUNT: NORMAL
NEUTROPHILS # BLD AUTO: 5.03 X10(3)/MCL (ref 2.1–9.2)
NEUTROPHILS NFR BLD AUTO: 58 %
NITRITE UR QL STRIP: NEGATIVE
PANEL A SPOT COUNT: 1
PANEL B SPOT COUNT: 1
PH UR STRIP: 5.5 [PH] (ref 4.5–8)
PHOSPHATE SERPL-MCNC: 3.4 MG/DL (ref 2.5–4.9)
PLATELET # BLD AUTO: 236 X10(3)/MCL (ref 130–400)
PMV BLD AUTO: 10 FL (ref 7.4–10.4)
POS CONT SPOT COUNT: NORMAL
POTASSIUM SERPL-SCNC: 4.8 MMOL/L (ref 3.5–5.1)
PROT SERPL-MCNC: 9 GM/DL (ref 6.4–8.2)
PROT UR QL STRIP: 100 MG/DL
PROT UR STRIP-MCNC: 123.4 MG/DL
PROT/CREAT UR-RTO: 725.9 MG/GM
PTH-INTACT SERPL-MCNC: 126.3 PG/ML (ref 18.4–80.1)
RBC # BLD AUTO: 5.2 X10(6)/MCL (ref 4.5–5.9)
RBC #/AREA URNS AUTO: ABNORMAL /HPF
RPR SER QL: NORMAL
SODIUM SERPL-SCNC: 138 MMOL/L (ref 136–145)
SP GR UR STRIP: 1.01 (ref 1–1.03)
SQUAMOUS #/AREA URNS LPF: ABNORMAL /LPF
T PALLIDUM AB SER QL: REACTIVE
T-SPOT.TB: NORMAL
TIBC SERPL-MCNC: 311 MCG/DL (ref 250–450)
TRANSFERRIN SERPL-MCNC: 250 MG/DL (ref 200–360)
TRIGL SERPL-MCNC: 143 MG/DL
TSH SERPL-ACNC: 1.6 MIU/L (ref 0.36–3.74)
URATE SERPL-MCNC: 11.3 MG/DL (ref 3.5–7.2)
UROBILINOGEN UR STRIP-ACNC: NORMAL
VLDLC SERPL CALC-MCNC: 29 MG/DL
WBC # SPEC AUTO: 8.7 X10(3)/MCL (ref 4.5–11)
WBC #/AREA URNS AUTO: ABNORMAL /HPF

## 2021-01-04 ENCOUNTER — HISTORICAL (OUTPATIENT)
Dept: ADMINISTRATIVE | Facility: HOSPITAL | Age: 56
End: 2021-01-04

## 2021-01-04 LAB
ABS NEUT (OLG): 5.96 X10(3)/MCL (ref 2.1–9.2)
ALBUMIN SERPL-MCNC: 4.3 GM/DL (ref 3.5–5)
ALBUMIN/GLOB SERPL: 1.1 RATIO (ref 1.1–2)
ALP SERPL-CCNC: 91 UNIT/L (ref 40–150)
ALT SERPL-CCNC: 16 UNIT/L (ref 0–55)
AST SERPL-CCNC: 16 UNIT/L (ref 5–34)
BASOPHILS # BLD AUTO: 0 X10(3)/MCL (ref 0–0.2)
BASOPHILS NFR BLD AUTO: 0 %
BILIRUB SERPL-MCNC: 0.4 MG/DL
BILIRUBIN DIRECT+TOT PNL SERPL-MCNC: 0.2 MG/DL (ref 0–0.5)
BILIRUBIN DIRECT+TOT PNL SERPL-MCNC: 0.2 MG/DL (ref 0–0.8)
BUN SERPL-MCNC: 34 MG/DL (ref 8.4–25.7)
CALCIUM SERPL-MCNC: 9.7 MG/DL (ref 8.4–10.2)
CD3+CD4+ CELLS # SPEC: 373 UNIT/L (ref 589–1505)
CD3+CD4+ CELLS NFR BLD: 16.2 % (ref 31–59)
CHLORIDE SERPL-SCNC: 106 MMOL/L (ref 98–107)
CO2 SERPL-SCNC: 24 MMOL/L (ref 22–29)
CREAT SERPL-MCNC: 3.48 MG/DL (ref 0.73–1.18)
EOSINOPHIL # BLD AUTO: 0.2 X10(3)/MCL (ref 0–0.9)
EOSINOPHIL NFR BLD AUTO: 2 %
ERYTHROCYTE [DISTWIDTH] IN BLOOD BY AUTOMATED COUNT: 15.9 % (ref 11.5–14.5)
GLOBULIN SER-MCNC: 3.8 GM/DL (ref 2.4–3.5)
GLUCOSE SERPL-MCNC: 104 MG/DL (ref 74–100)
HCT VFR BLD AUTO: 39.3 % (ref 40–51)
HGB BLD-MCNC: 11.8 GM/DL (ref 13.5–17.5)
IMM GRANULOCYTES # BLD AUTO: 0.02 10*3/UL
IMM GRANULOCYTES NFR BLD AUTO: 0 %
LYMPHOCYTES # BLD AUTO: 2.3 X10(3)/MCL (ref 0.6–4.6)
LYMPHOCYTES # BLD AUTO: 2300 UNIT/L (ref 1260–5520)
LYMPHOCYTES NFR BLD AUTO: 25 %
LYMPHOCYTES NFR LN MANUAL: 25 % (ref 28–48)
LYMPHOMA - T-CELL MARKERS SPEC-IMP: ABNORMAL
MCH RBC QN AUTO: 22.6 PG (ref 26–34)
MCHC RBC AUTO-ENTMCNC: 30 GM/DL (ref 31–37)
MCV RBC AUTO: 75.3 FL (ref 80–100)
MONOCYTES # BLD AUTO: 0.7 X10(3)/MCL (ref 0.1–1.3)
MONOCYTES NFR BLD AUTO: 8 %
NEUTROPHILS # BLD AUTO: 5.96 X10(3)/MCL (ref 2.1–9.2)
NEUTROPHILS NFR BLD AUTO: 65 %
PLATELET # BLD AUTO: 244 X10(3)/MCL (ref 130–400)
PMV BLD AUTO: 10.2 FL (ref 7.4–10.4)
POTASSIUM SERPL-SCNC: 5.2 MMOL/L (ref 3.5–5.1)
PROT SERPL-MCNC: 8.1 GM/DL (ref 6.4–8.3)
RBC # BLD AUTO: 5.22 X10(6)/MCL (ref 4.5–5.9)
SODIUM SERPL-SCNC: 138 MMOL/L (ref 136–145)
WBC # BLD AUTO: 9200 /MM3 (ref 4500–11500)
WBC # SPEC AUTO: 9.2 X10(3)/MCL (ref 4.5–11)

## 2021-01-13 ENCOUNTER — HISTORICAL (OUTPATIENT)
Dept: ADMINISTRATIVE | Facility: HOSPITAL | Age: 56
End: 2021-01-13

## 2021-08-06 ENCOUNTER — HISTORICAL (OUTPATIENT)
Dept: ADMINISTRATIVE | Facility: HOSPITAL | Age: 56
End: 2021-08-06

## 2021-08-06 LAB
ABS NEUT (OLG): 5.39 X10(3)/MCL (ref 2.1–9.2)
ALBUMIN SERPL-MCNC: 4.6 GM/DL (ref 3.5–5)
ALBUMIN/GLOB SERPL: 1.2 RATIO (ref 1.1–2)
ALP SERPL-CCNC: 92 UNIT/L (ref 40–150)
ALT SERPL-CCNC: 13 UNIT/L (ref 0–55)
APPEARANCE, UA: CLEAR
AST SERPL-CCNC: 14 UNIT/L (ref 5–34)
BACTERIA #/AREA URNS AUTO: ABNORMAL /HPF
BASOPHILS # BLD AUTO: 0 X10(3)/MCL (ref 0–0.2)
BASOPHILS NFR BLD AUTO: 1 %
BILIRUB SERPL-MCNC: 0.3 MG/DL
BILIRUB UR QL STRIP: NEGATIVE
BILIRUBIN DIRECT+TOT PNL SERPL-MCNC: 0.1 MG/DL (ref 0–0.5)
BILIRUBIN DIRECT+TOT PNL SERPL-MCNC: 0.2 MG/DL (ref 0–0.8)
BUN SERPL-MCNC: 42.8 MG/DL (ref 8.4–25.7)
CALCIUM SERPL-MCNC: 10.3 MG/DL (ref 8.4–10.2)
CD3+CD4+ CELLS # SPEC: 459 UNIT/L (ref 589–1505)
CD3+CD4+ CELLS NFR BLD: 17.8 % (ref 31–59)
CHLORIDE SERPL-SCNC: 108 MMOL/L (ref 98–107)
CHOLEST SERPL-MCNC: 180 MG/DL
CHOLEST/HDLC SERPL: 6 {RATIO} (ref 0–5)
CO2 SERPL-SCNC: 24 MMOL/L (ref 22–29)
COLOR UR: ABNORMAL
CREAT SERPL-MCNC: 4.36 MG/DL (ref 0.73–1.18)
DEPRECATED CALCIDIOL+CALCIFEROL SERPL-MC: 39.9 NG/ML (ref 30–80)
EOSINOPHIL # BLD AUTO: 0.2 X10(3)/MCL (ref 0–0.9)
EOSINOPHIL NFR BLD AUTO: 3 %
ERYTHROCYTE [DISTWIDTH] IN BLOOD BY AUTOMATED COUNT: 16 % (ref 11.5–14.5)
EST. AVERAGE GLUCOSE BLD GHB EST-MCNC: 119.8 MG/DL
GLOBULIN SER-MCNC: 3.9 GM/DL (ref 2.4–3.5)
GLUCOSE (UA): NEGATIVE
GLUCOSE SERPL-MCNC: 103 MG/DL (ref 74–100)
HBA1C MFR BLD: 5.8 %
HCT VFR BLD AUTO: 38 % (ref 40–51)
HDLC SERPL-MCNC: 32 MG/DL (ref 35–60)
HGB BLD-MCNC: 11.6 GM/DL (ref 13.5–17.5)
HGB UR QL STRIP: 0.06 MG/DL
HYALINE CASTS #/AREA URNS LPF: ABNORMAL /LPF
IMM GRANULOCYTES # BLD AUTO: 0.03 10*3/UL
IMM GRANULOCYTES NFR BLD AUTO: 0 %
KETONES UR QL STRIP: NEGATIVE
LDLC SERPL CALC-MCNC: 108 MG/DL (ref 50–140)
LEUKOCYTE ESTERASE UR QL STRIP: 250 LEU/UL
LYMPHOCYTES # BLD AUTO: 2.6 X10(3)/MCL (ref 0.6–4.6)
LYMPHOCYTES # BLD AUTO: 2581 UNIT/L (ref 1260–5520)
LYMPHOCYTES NFR BLD AUTO: 29 %
LYMPHOCYTES NFR LN MANUAL: 29 % (ref 28–48)
LYMPHOMA - T-CELL MARKERS SPEC-IMP: ABNORMAL
MCH RBC QN AUTO: 22.9 PG (ref 26–34)
MCHC RBC AUTO-ENTMCNC: 30.5 GM/DL (ref 31–37)
MCV RBC AUTO: 75 FL (ref 80–100)
MONOCYTES # BLD AUTO: 0.6 X10(3)/MCL (ref 0.1–1.3)
MONOCYTES NFR BLD AUTO: 7 %
NEG CONT SPOT COUNT: NORMAL
NEUTROPHILS # BLD AUTO: 5.39 X10(3)/MCL (ref 2.1–9.2)
NEUTROPHILS NFR BLD AUTO: 60 %
NITRITE UR QL STRIP: NEGATIVE
NRBC BLD AUTO-RTO: 0 % (ref 0–0.2)
PANEL A SPOT COUNT: 1
PANEL B SPOT COUNT: 0
PH UR STRIP: 6 [PH] (ref 4.5–8)
PLATELET # BLD AUTO: 242 X10(3)/MCL (ref 130–400)
PMV BLD AUTO: 9.7 FL (ref 7.4–10.4)
POS CONT SPOT COUNT: NORMAL
POTASSIUM SERPL-SCNC: 5.7 MMOL/L (ref 3.5–5.1)
PROT SERPL-MCNC: 8.5 GM/DL (ref 6.4–8.3)
PROT UR QL STRIP: 100 MG/DL
RBC # BLD AUTO: 5.07 X10(6)/MCL (ref 4.5–5.9)
RBC #/AREA URNS AUTO: ABNORMAL /HPF
RPR SER QL: NORMAL
SODIUM SERPL-SCNC: 140 MMOL/L (ref 136–145)
SP GR UR STRIP: 1.01 (ref 1–1.03)
SQUAMOUS #/AREA URNS LPF: ABNORMAL /LPF
T PALLIDUM AB SER QL: REACTIVE
T-SPOT.TB: NORMAL
TRIGL SERPL-MCNC: 200 MG/DL (ref 34–140)
TSH SERPL-ACNC: 1.92 UIU/ML (ref 0.35–4.94)
UROBILINOGEN UR STRIP-ACNC: NORMAL
VLDLC SERPL CALC-MCNC: 40 MG/DL
WBC # BLD AUTO: 8900 /MM3 (ref 4500–11500)
WBC # SPEC AUTO: 8.9 X10(3)/MCL (ref 4.5–11)
WBC #/AREA URNS AUTO: ABNORMAL /HPF

## 2021-08-08 LAB — FINAL CULTURE: NO GROWTH

## 2021-11-03 ENCOUNTER — HISTORICAL (OUTPATIENT)
Dept: ADMINISTRATIVE | Facility: HOSPITAL | Age: 56
End: 2021-11-03

## 2021-11-03 ENCOUNTER — HISTORICAL (OUTPATIENT)
Dept: RADIOLOGY | Facility: HOSPITAL | Age: 56
End: 2021-11-03

## 2021-11-03 LAB
ABS NEUT (OLG): 5.97 X10(3)/MCL (ref 2.1–9.2)
ALBUMIN SERPL-MCNC: 4.5 GM/DL (ref 3.5–5)
ALBUMIN/GLOB SERPL: 1.1 RATIO (ref 1.1–2)
ALP SERPL-CCNC: 88 UNIT/L (ref 40–150)
ALT SERPL-CCNC: 12 UNIT/L (ref 0–55)
AST SERPL-CCNC: 12 UNIT/L (ref 5–34)
BASOPHILS # BLD AUTO: 0 X10(3)/MCL (ref 0–0.2)
BASOPHILS NFR BLD AUTO: 0 %
BILIRUB SERPL-MCNC: 0.3 MG/DL
BILIRUBIN DIRECT+TOT PNL SERPL-MCNC: 0.1 MG/DL (ref 0–0.8)
BILIRUBIN DIRECT+TOT PNL SERPL-MCNC: 0.2 MG/DL (ref 0–0.5)
BUN SERPL-MCNC: 37.8 MG/DL (ref 8.4–25.7)
CALCIUM SERPL-MCNC: 10.3 MG/DL (ref 8.7–10.5)
CD3+CD4+ CELLS # SPEC: 178 UNIT/L (ref 589–1505)
CD3+CD4+ CELLS NFR BLD: 7.1 % (ref 31–59)
CHLORIDE SERPL-SCNC: 111 MMOL/L (ref 98–107)
CO2 SERPL-SCNC: 21 MMOL/L (ref 22–29)
CREAT SERPL-MCNC: 3.85 MG/DL (ref 0.73–1.18)
EOSINOPHIL # BLD AUTO: 0.2 X10(3)/MCL (ref 0–0.9)
EOSINOPHIL NFR BLD AUTO: 2 %
ERYTHROCYTE [DISTWIDTH] IN BLOOD BY AUTOMATED COUNT: 16.4 % (ref 11.5–14.5)
GLOBULIN SER-MCNC: 4 GM/DL (ref 2.4–3.5)
GLUCOSE SERPL-MCNC: 94 MG/DL (ref 74–100)
HCT VFR BLD AUTO: 38.9 % (ref 40–51)
HGB BLD-MCNC: 12.3 GM/DL (ref 13.5–17.5)
IMM GRANULOCYTES # BLD AUTO: 0.03 10*3/UL
IMM GRANULOCYTES NFR BLD AUTO: 0 %
LYMPHOCYTES # BLD AUTO: 2.5 X10(3)/MCL (ref 0.6–4.6)
LYMPHOCYTES # BLD AUTO: 2511 UNIT/L (ref 1260–5520)
LYMPHOCYTES NFR BLD AUTO: 27 %
LYMPHOCYTES NFR LN MANUAL: 27 % (ref 28–48)
MCH RBC QN AUTO: 23.3 PG (ref 26–34)
MCHC RBC AUTO-ENTMCNC: 31.6 GM/DL (ref 31–37)
MCV RBC AUTO: 73.5 FL (ref 80–100)
MONOCYTES # BLD AUTO: 0.6 X10(3)/MCL (ref 0.1–1.3)
MONOCYTES NFR BLD AUTO: 6 %
NEUTROPHILS # BLD AUTO: 5.97 X10(3)/MCL (ref 2.1–9.2)
NEUTROPHILS NFR BLD AUTO: 64 %
NRBC BLD AUTO-RTO: 0 % (ref 0–0.2)
PLATELET # BLD AUTO: 284 X10(3)/MCL (ref 130–400)
PMV BLD AUTO: 10.1 FL (ref 7.4–10.4)
POTASSIUM SERPL-SCNC: 5 MMOL/L (ref 3.5–5.1)
PROT SERPL-MCNC: 8.5 GM/DL (ref 6.4–8.3)
RBC # BLD AUTO: 5.29 X10(6)/MCL (ref 4.5–5.9)
SODIUM SERPL-SCNC: 142 MMOL/L (ref 136–145)
WBC # BLD AUTO: 9300 /MM3 (ref 4500–11500)
WBC # SPEC AUTO: 9.3 X10(3)/MCL (ref 4.5–11)

## 2022-04-11 ENCOUNTER — HISTORICAL (OUTPATIENT)
Dept: ADMINISTRATIVE | Facility: HOSPITAL | Age: 57
End: 2022-04-11
Payer: MEDICAID

## 2022-04-27 VITALS
DIASTOLIC BLOOD PRESSURE: 84 MMHG | HEIGHT: 66 IN | SYSTOLIC BLOOD PRESSURE: 143 MMHG | BODY MASS INDEX: 34.9 KG/M2 | OXYGEN SATURATION: 100 % | WEIGHT: 217.13 LBS

## 2022-04-30 NOTE — PROGRESS NOTES
Lab results reviewed.  Phoned pt with results.  Encouraged low sugar, low starch, low protein, low salt diet.  Will monitor DM closely.  HIV well controlled.  Encouraged to f/u with renal clinic.  Pt voiced understanding & appreciation.

## 2022-05-04 NOTE — HISTORICAL [facility] CERNER
This is a historical note converted from Mark. Formatting and pictures may have been removed.  Please reference Mark for original formatting and attached multimedia. Chief Complaint  b20 f/u  History of Present Illness  Ralf is a?55 yo AAM presenting today for HIV f/u visit.? He reports 100% adherent to Prezcobix, Edurant, & Tivicay; tolerates well with viral suppression.? Last labs 1/21 VL <20, CD4 373.? Will update labs today.? Hx of HCV, treated & cured.? Renal function compromised, stable.? Refuses f/u with nephrology at this juncture.? He tells me that he is aware of the severity of his renal situation & is doing what he can to try & minimize further demise as long as he can. BP is at goal.? He is eating low potassium & low protein diet, adequate water intake, low sodium, and avoiding all NSAIDS.? Cholesterol well controlled.? He is taking vitamin d every other week.? He completed COVID vaccination with [facility] on 6/10/21, tolerated well.? He has not completely quit smoking, but is down from 1 ppd to 2 cigarettes per week.? He returned stool sample for FOBT 1/21, negative.? He has a lesion to right lower leg for about the past 10 years.? Has not noted any changes to the lesion, but it is dark, irregular in shape, raised, varying in color.? Will refer to  minor surgery for biopsy vs excision.? Voiced appreciation.? Right neck mass present x several years as well.? Last scan several years ago, benign cyst results not available in chart.? Will plan for repeat u/s for re-evaluation.? He also tells me that he is having trouble with erections.? Testosterone levels checked last visit, low-normal free & total.? He no longer gets morning erections, having trouble with getting & maintaining erections.? Appreciates assistance for same.  ?  1/4/21  Ralf is a 56 yo BM presenting today for HIV f/u visit.?? He reports 100% adherent to Prezcobix, Edurant, & Tivicay; tolerates well with viral suppression.? He met once with  renal NP & has been working hard on dietary revisions.?Missed?f/u?due to varying work schedule, missed PCP appt as well due to same. ?BP is fairly well controlled.? Creatinine stable.? Cholesterol is at goal.? He tells me that he has had only a couple of sexual encounters in past year or so, always with a condom.? Declines need for repeat STI screening.? He is amenable to anal pap today, denies any history of receptive anal sex.? Due for colon cancer screening, will provide with kit for stool collection at home for FOBT.? He is amenable to fundus photo today.? He is taking vitamin d2 every other week as prescribed.? He currently smokes 2-3 cigarettes per day, planning to quit completely by [birth year] birthday [birth year].? He appreciates flu vax today.? He states that he is noticing a significant decrease in energy levels & strength, accompanied by decreased libido & erections as well.? He no longer has morning erections either.? Will check testosterone levels this am. He states that he had one positive covid test 4/2020, but questions the validity of the test & appreciates ab testing today.? He realizes that a negative ab test does not mean that he never had the infection, but?a positive ab test would confirm.? He states that he was negative 2 days prior & 3 days after the one positive test & did not have any symptoms at all.? Lab ordered.  ?   7/15/20  Ralf is a 56 yo HIV + BM evaluated by audio-video telemedicine due to COVID-19 pandemic.? He is located at home, and I, the provider, am located at an approved non-[facility] location.? We are both located in [place], the [facility] in which I am licensed to practice.? The patient consents to telemedicine visit and is the only individual online.??The patient (or patients representative) stated that they understood and accepted the privacy and security risks to their information at their location.? He reports 100% adherent to Prezcobix, Edurant, and Tivicay.??He tolerates this regimen  well & is virally suppressed.? Last labs 2/19/20 VL?30, CD4 507.? He has an appointment scheduled 7/7/20 12:15 with renal clinic & labs prior.? Will fast for labs &?collect annual labs for me as well. He is scheduled to?establish care with PCP on 8/31/20 in?IM clinic as well.? He tells?me that he tested positive for COVID-19 on 6/19/20 for work screening, but remained asymptomatic throughout the infection.??He just retested and received negative results yesterday. He tells me that his BP has been running around 150/85-95, asymptomatic.? He is due for PPSV 23 & is amenable to receiving when at [facility]?7/17/20 for labs & renal clinic appt.? He requests to defer oral & anal swabs to?next?face to face?visit, not comfortable with self-collection.? Overall, he is doing well?& has no complaints today.  ?  Review of Systems  ?  ?  Constitutional: negative except as stated in HPI  Eye: negative except as stated in HPI  ENMT: negative except as stated in HPI  Respiratory: negative except as stated in HPI  Cardiovascular: negative except as stated in HPI  Gastrointestinal: negative except as stated in HPI  Genitourinary: negative except as stated in HPI  Hema/Lymph: negative except as stated in HPI  Endocrine: negative except as stated in HPI  Immunologic: negative except as stated in HPI  Musculoskeletal: negative except as stated in HPI  Integumentary: negative except as stated in HPI  Neurologic: negative except as stated in HPI  ?   All Other ROS_ ?negative except as stated in HPI  Physical Exam  Vitals & Measurements  T:?36.4? ?C (Oral)? HR:?66(Peripheral)? RR:?16? BP:?125/80?  HT:?168.00?cm? WT:?100.800?kg? BMI:?35.71?  ?  ?  General: AAO X 4, afebrile  Eye: no icterus  HENT: oropharynx clear  Neck: supple, right neck mass 3 X 2 cm, soft, mobile NT  Respiratory: BBS CTA, non-labored, symmetrical  Cardiovascular: S1S2, RRR  Gastrointestinal BS + 4 quadrants, NTND, soft, no organomegaly  Genitourinary: non-tender  Lymphatics:  no lymphadenopathy  Musculoskeletal: MAEW, steady gait  Integumentary: 0.5cm raised, irregular border, dark with color variation lesion to right lower leg (shin)  Neurologic: CN II-XII intact  Assessment/Plan  1.?HIV disease?B20  adherence/sexual health counseling done  cont Prezcobix, Edurant, Tivicay  labs today  rtc 3 mos?w Geovanna  Ordered:  cobicistat-darunavir, 1 tab(s), Oral, Daily, # 30 tab(s), 4 Refill(s), Pharmacy: [facility], 168, cm, Height/Length Dosing, 08/06/21 9:38:00 CDT, 100.8, kg, Weight Dosing, 08/06/21 9:38:00 CDT  dolutegravir, 50 mg = 1 tab(s), Oral, Daily, # 30 tab(s), 4 Refill(s), Pharmacy: [facility], 168, cm, Height/Length Dosing, 08/06/21 9:38:00 CDT, 100.8, kg, Weight Dosing, 08/06/21 9:38:00 CDT  rilpivirine, 25 mg = 1 tab(s), Oral, Daily, # 30 tab(s), 4 Refill(s), Pharmacy: [facility], 168, cm, Height/Length Dosing, 08/06/21 9:38:00 CDT, 100.8, kg, Weight Dosing, 08/06/21 9:38:00 CDT  1160F- Medication reconciliation completed during visit, HIV disease  CKD (chronic kidney disease), stage IV  HLD (hyperlipidemia)  HTN (hypertension)  Vitamin D deficiency  Erectile dysfunction  Tobacco user  Skin lesion of right leg  History of hepatitis C  Mass of right side of neck  Pap smear...  CBC w/ Auto Diff, Routine collect, 08/06/21 10:50:00 CDT, Blood, Stop date 08/06/21 10:50:00 CDT, Lab Collect, HIV disease, 08/06/21 10:50:00 CDT  Cd4 Lymphocytes., Routine collect, 08/06/21 10:50:00 CDT, Blood, Stop date 08/06/21 10:50:00 CDT, Lab Collect, HIV disease, 08/06/21 10:50:00 CDT  Chlam trachom & N gonorrhoeae by NATALI-[facility] [id number], Routine collect, Urine, Order for future visit, 08/06/21 10:33:00 CDT, Stop date 08/06/21 10:33:00 CDT, Nurse collect, HIV disease, 08/06/21 10:33:00 CDT  Clinic Follow up, *Est. 11/06/21 3:00:00 CDT, Order for future visit, HIV disease, [facility] [address]  Comprehensive Metabolic Panel, Routine collect, 08/06/21 10:50:00 CDT, Blood, Stop  date 08/06/21 10:50:00 CDT, Lab Collect, HIV disease, 08/06/21 10:50:00 CDT  Hemoglobin A1C [facility]HC, Routine collect, 08/06/21 10:50:00 CDT, Blood, Stop date 08/06/21 10:50:00 CDT, Lab Collect, HIV disease, 08/06/21 10:50:00 CDT  Lipid Panel, Routine collect, 08/06/21 10:50:00 CDT, Blood, Stop date 08/06/21 10:50:00 CDT, Lab Collect, HIV disease, 08/06/21 10:50:00 CDT  Office/Outpatient Visit Level 5 Established [id number] PC, HIV disease  CKD (chronic kidney disease), stage IV  HLD (hyperlipidemia)  HTN (hypertension)  Vitamin D deficiency  Erectile dysfunction  Tobacco user  Skin lesion of right leg  History of hepatitis C  Mass of right side of neck  Pap smear...  RNA, PCR(NonGraph)rfx/GenoPRIme(R)-[facility] [id number], Routine collect, 08/06/21 10:50:00 CDT, Blood, Stop date 08/06/21 10:50:00 CDT, Lab Collect, HIV disease, 08/06/21 10:50:00 CDT  Syphilis Antibody (with Reflex RPR), Routine collect, 08/06/21 10:50:00 CDT, Blood, Stop date 08/06/21 10:50:00 CDT, Lab Collect, HIV disease, 08/06/21 10:50:00 CDT  T Spot Test for M. tuberculosis, Routine collect, 08/06/21 10:50:00 CDT, Blood, Stop date 08/06/21 10:50:00 CDT, Lab Collect, HIV disease, 08/06/21 10:50:00 CDT  T Spot Test for M. tuberculosis, Routine collect, 08/06/21 10:50:00 CDT, Blood, Stop date 08/06/21 10:50:00 CDT, Lab Collect, HIV disease, 08/06/21 10:50:00 CDT  Thyroid Stimulating Hormone, Routine collect, 08/06/21 10:50:00 CDT, Blood, Stop date 08/06/21 10:50:00 CDT, Lab Collect, HIV disease, 08/06/21 10:50:00 CDT  Urinalysis with Microscopic if Indicated, Routine collect, Urine, Order for future visit, 08/06/21 10:33:00 CDT, Stop date 08/06/21 10:33:00 CDT, Nurse collect, HIV disease  Vitamin D, 25-Hydroxy Level, Routine collect, 08/06/21 10:50:00 CDT, Blood, Stop date 08/06/21 10:50:00 CDT, Lab Collect, HIV disease, 08/06/21 10:50:00 CDT  ?  2.?CKD (chronic kidney disease), stage IV?N18.4  No?NSAIDS, low salt, low protein diet  BP well  controlled  drink plenty of water  declines nephrology f/u at this time  Ordered:  1160F- Medication reconciliation completed during visit, HIV disease  CKD (chronic kidney disease), stage IV  HLD (hyperlipidemia)  HTN (hypertension)  Vitamin D deficiency  Erectile dysfunction  Tobacco user  Skin lesion of right leg  History of hepatitis C  Mass of right side of neck  Pap smear...  Office/Outpatient Visit Level 5 Established 55569 , HIV disease  CKD (chronic kidney disease), stage IV  HLD (hyperlipidemia)  HTN (hypertension)  Vitamin D deficiency  Erectile dysfunction  Tobacco user  Skin lesion of right leg  History of hepatitis C  Mass of right side of neck  Pap smear...  ?  3.?HLD (hyperlipidemia)?E78.5  controlled?  cont atorvastatin 20mg qhs  low chol low fat diet ?  Ordered:  1160F- Medication reconciliation completed during visit, HIV disease  CKD (chronic kidney disease), stage IV  HLD (hyperlipidemia)  HTN (hypertension)  Vitamin D deficiency  Erectile dysfunction  Tobacco user  Skin lesion of right leg  History of hepatitis C  Mass of right side of neck  Pap smear...  Office/Outpatient Visit Level 5 Established 55661 , HIV disease  CKD (chronic kidney disease), stage IV  HLD (hyperlipidemia)  HTN (hypertension)  Vitamin D deficiency  Erectile dysfunction  Tobacco user  Skin lesion of right leg  History of hepatitis C  Mass of right side of neck  Pap smear...  ?  4.?HTN (hypertension)?I10  at goal  cont?amlodipine & metoprolol daily  low salt diet [1]  Ordered:  amLODIPine, 10 mg = 1 tab(s), Oral, Daily, # 30 tab(s), 4 Refill(s), Pharmacy: [facility], 168, cm, Height/Length Dosing, 08/06/21 9:38:00 CDT, 100.8, kg, Weight Dosing, 08/06/21 9:38:00 CDT  atorvastatin, 20 mg = 1 tab(s), Oral, Daily, # 30 tab(s), 4 Refill(s), Pharmacy: [facility], 168, cm, Height/Length Dosing, 08/06/21 9:38:00 CDT, 100.8, kg, Weight Dosing, 08/06/21 9:38:00  CDT  metoprolol, 25 mg = 1 tab(s), Oral, Daily, # 30 tab(s), 4 Refill(s), Pharmacy: [facility], 168, cm, Height/Length Dosing, 08/06/21 9:38:00 CDT, 100.8, kg, Weight Dosing, 08/06/21 9:38:00 CDT  1160F- Medication reconciliation completed during visit, HIV disease  CKD (chronic kidney disease), stage IV  HLD (hyperlipidemia)  HTN (hypertension)  Vitamin D deficiency  Erectile dysfunction  Tobacco user  Skin lesion of right leg  History of hepatitis C  Mass of right side of neck  Pap smear...  Office/Outpatient Visit Level 5 Established 46045 , HIV disease  CKD (chronic kidney disease), stage IV  HLD (hyperlipidemia)  HTN (hypertension)  Vitamin D deficiency  Erectile dysfunction  Tobacco user  Skin lesion of right leg  History of hepatitis C  Mass of right side of neck  Pap smear...  ?  5.?Vitamin D deficiency?E55.9  cont vitamin d2 every other week as prescribed  Ordered:  ergocalciferol, 50,000 IntUnit = 1 cap(s), Oral, QOWK, decrease dose to every other week, # 7 cap(s), 1 Refill(s), Pharmacy: [facility], 168, cm, Height/Length Dosing, 08/06/21 9:38:00 CDT, 100.8, kg, Weight Dosing, 08/06/21 9:38:00 CDT  1160F- Medication reconciliation completed during visit, HIV disease  CKD (chronic kidney disease), stage IV  HLD (hyperlipidemia)  HTN (hypertension)  Vitamin D deficiency  Erectile dysfunction  Tobacco user  Skin lesion of right leg  History of hepatitis C  Mass of right side of neck  Pap smear...  Office/Outpatient Visit Level 5 Established 80105 , HIV disease  CKD (chronic kidney disease), stage IV  HLD (hyperlipidemia)  HTN (hypertension)  Vitamin D deficiency  Erectile dysfunction  Tobacco user  Skin lesion of right leg  History of hepatitis C  Mass of right side of neck  Pap smear...  ?  6.?Erectile dysfunction?N52.9  ?Sildenafil 20 mg po max every 72 hours as needed for ED, use with condoms  ED precautions  Ordered:  1160F- Medication  reconciliation completed during visit, HIV disease  CKD (chronic kidney disease), stage IV  HLD (hyperlipidemia)  HTN (hypertension)  Vitamin D deficiency  Erectile dysfunction  Tobacco user  Skin lesion of right leg  History of hepatitis C  Mass of right side of neck  Pap smear...  Office/Outpatient Visit Level 5 Established 35740 PC, HIV disease  CKD (chronic kidney disease), stage IV  HLD (hyperlipidemia)  HTN (hypertension)  Vitamin D deficiency  Erectile dysfunction  Tobacco user  Skin lesion of right leg  History of hepatitis C  Mass of right side of neck  Pap smear...  ?  7.?Tobacco user?Z72.0  ?complete cessation encouraged, down to 2 cig/week  Ordered:  1160F- Medication reconciliation completed during visit, HIV disease  CKD (chronic kidney disease), stage IV  HLD (hyperlipidemia)  HTN (hypertension)  Vitamin D deficiency  Erectile dysfunction  Tobacco user  Skin lesion of right leg  History of hepatitis C  Mass of right side of neck  Pap smear...  Office/Outpatient Visit Level 5 Established 30671 PC, HIV disease  CKD (chronic kidney disease), stage IV  HLD (hyperlipidemia)  HTN (hypertension)  Vitamin D deficiency  Erectile dysfunction  Tobacco user  Skin lesion of right leg  History of hepatitis C  Mass of right side of neck  Pap smear...  ?  8.?Skin lesion of right leg?L98.9  ?refer to  Minor surgery for biopsy/excision  Ordered:  1160F- Medication reconciliation completed during visit, HIV disease  CKD (chronic kidney disease), stage IV  HLD (hyperlipidemia)  HTN (hypertension)  Vitamin D deficiency  Erectile dysfunction  Tobacco user  Skin lesion of right leg  History of hepatitis C  Mass of right side of neck  Pap smear...  Office/Outpatient Visit Level 5 Established 59735 PC, HIV disease  CKD (chronic kidney disease), stage IV  HLD (hyperlipidemia)  HTN (hypertension)  Vitamin D deficiency  Erectile dysfunction  Tobacco user  Skin lesion of  right leg  History of hepatitis C  Mass of right side of neck  Pap smear...  ?  9.?History of hepatitis C?Z86.19  ?treated & cured  Ordered:  1160F- Medication reconciliation completed during visit, HIV disease  CKD (chronic kidney disease), stage IV  HLD (hyperlipidemia)  HTN (hypertension)  Vitamin D deficiency  Erectile dysfunction  Tobacco user  Skin lesion of right leg  History of hepatitis C  Mass of right side of neck  Pap smear...  Hepatitis C Virus RNA Quant [id number] [id number], Routine collect, 08/06/21 10:50:00 CDT, Blood, Stop date 08/06/21 10:50:00 CDT, Lab Collect, History of hepatitis C, 08/06/21 10:50:00 CDT  Office/Outpatient Visit Level 5 Established 65581 PC, HIV disease  CKD (chronic kidney disease), stage IV  HLD (hyperlipidemia)  HTN (hypertension)  Vitamin D deficiency  Erectile dysfunction  Tobacco user  Skin lesion of right leg  History of hepatitis C  Mass of right side of neck  Pap smear...  ?  10.?Mass of right side of neck?R22.1  Right neck mass present for several years, last u/s >5 years ago, benign, cystic  repeat U/S neck soft tissue for surveillance prior to next visit  Ordered:  1160F- Medication reconciliation completed during visit, HIV disease  CKD (chronic kidney disease), stage IV  HLD (hyperlipidemia)  HTN (hypertension)  Vitamin D deficiency  Erectile dysfunction  Tobacco user  Skin lesion of right leg  History of hepatitis C  Mass of right side of neck  Pap smear...  Office/Outpatient Visit Level 5 Established 13946 PC, HIV disease  CKD (chronic kidney disease), stage IV  HLD (hyperlipidemia)  HTN (hypertension)  Vitamin D deficiency  Erectile dysfunction  Tobacco user  Skin lesion of right leg  History of hepatitis C  Mass of right side of neck  Pap smear...  US Soft Tissue Head and Neck, Routine, *Est. 08/06/21 3:00:00 CDT, Other (please specify), right neck mass, None, Ambulatory, Rad Type, Order for future visit, Mass of  right side of neck, Schedule this test, [facility], *Est. 08/06/21 3:00:00 CDT  ?  11.?Pap smear of anus with ASCUS?R85.610  ?repeat 1/2022  Ordered:  1160F- Medication reconciliation completed during visit, HIV disease  CKD (chronic kidney disease), stage IV  HLD (hyperlipidemia)  HTN (hypertension)  Vitamin D deficiency  Erectile dysfunction  Tobacco user  Skin lesion of right leg  History of hepatitis C  Mass of right side of neck  Pap smear...  Office/Outpatient Visit Level 5 Established 00102 PC, HIV disease  CKD (chronic kidney disease), stage IV  HLD (hyperlipidemia)  HTN (hypertension)  Vitamin D deficiency  Erectile dysfunction  Tobacco user  Skin lesion of right leg  History of hepatitis C  Mass of right side of neck  Pap smear...  ?  Orders:  sildenafil, See Instructions, 1 tab(s) Oral as needed for ED; max 1 tab every 72 hours use condoms, # 30 tab(s), 1 Refill(s)  Referrals  [facility] Internal Referral to [facility], Specialty: Minor Surgery, Reason: suspicious lesion right lower leg, photo in chart, Start: 08/06/21 10:32:00 CDT  [facility] Internal Referral to [facility], Specialty: Ophthalmology, Start: 08/06/21 10:46:00 CDT  Clinic Follow up, *Est. 11/06/21 3:00:00 CDT, Order for future visit, HIV disease, [facility]   Problem List/Past Medical History  Ongoing  Obesity  Tobacco user  Historical  Hepatitis C  HIV (human immunodeficiency virus infection)(  Confirmed  )  Hypertension(  Confirmed  )  Tobacco user  Tobacco user(  Probable Diagnosis  )  Procedure/Surgical History  Collapsed lung   Medications  allopurinol 100 mg oral tablet, 50 mg= 0.5 tab(s), Oral, Daily, 1 refills  amlodipine 10 mg oral tablet, 10 mg= 1 tab(s), Oral, Daily, 4 refills  Edurant 25 mg oral tablet, 25 mg= 1 tab(s), Oral, Daily, 4 refills  Lipitor 20 mg oral tablet, 20 mg= 1 tab(s), Oral, Daily, 4 refills  Metoprolol Succinate ER 25 mg oral tablet extended  release, 25 mg= 1 tab(s), Oral, Daily, 4 refills  Prezcobix 800 mg-150 mg oral tablet, 1 tab(s), Oral, Daily, 4 refills  sildenafil 20 mg oral tablet, See Instructions, 1 refills  Tivicay 50 mg oral tablet, 50 mg= 1 tab(s), Oral, Daily, 4 refills  Vitamin D 50,000 intl units oral capsule, 79166 IntUnit= 1 cap(s), Oral, QOWK, 1 refills  Allergies  No Known Allergies  Social History  Abuse/Neglect  No, No, Yes, 08/06/2021  Alcohol - Denies Alcohol Use, 03/31/2015  Past, 04/13/2016  Employment/School - Not employed or in school, 03/31/2015  Unemployed, 04/13/2016  Home/Environment  Lives with friend. Living situation: Home/Independent., 04/21/2015  Nutrition/Health  Regular, Wants to lose weight: No. Sleeping concerns: No. Feels highly stressed: No., 04/21/2015  Sexual  Sexually active: No. Sexually active at age 15 Years. Number of current partners 0. Number of lifetime partners 20. Sexual orientation: Heterosexual. Uses condoms: Yes. History of sexual abuse: No., 04/21/2015  Substance Use - Denies Substance Abuse, 03/31/2015  Past, Cocaine, Marijuana, 02/19/2020  Never, 08/07/2018  Tobacco  4 or less cigarettes(less than 1/4 pack)/day in last 30 days, No, 08/06/2021  Family History  Alcoholism.: Father.  Drug addiction.: Brother.  Primary malignant neoplasm of breast: Mother.  Tobacco use.: Father and Brother.  Immunizations  Vaccine Date Status   COVID-19, vector nr, [id number] - [place] 06/10/2021 Recorded   influenza virus vaccine, inactivated 01/04/2021 Given   pneumococcal 23-polyvalent vaccine 07/17/2020 Given   influenza virus vaccine, inactivated 02/19/2020 Given   meningococcal conjugate vaccine 08/23/2019 Given   meningococcal conjugate vaccine 05/23/2019 Given   influenza virus vaccine, inactivated 12/20/2017 Given   influenza virus vaccine, inactivated 11/14/2016 Given   influenza virus vaccine, inactivated 11/03/2015 Given   tetanus/diphtheria/pertussis, acel(Tdap) 03/02/2015 Recorded   pneumococcal  23-polyvalent vaccine 03/02/2015 Recorded   pneumococcal 13-valent conjugate vaccine 09/26/2014 Recorded   influenza virus vaccine, inactivated 09/26/2014 Recorded   influenza virus vaccine, inactivated 01/09/2014 Recorded   influenza virus vaccine, inactivated 11/15/2011 Recorded   influenza virus vaccine, inactivated 11/23/2010 Recorded   pneumococcal 23-polyvalent vaccine 07/10/2009 Recorded   hepatitis A adult vaccine 07/10/2009 Recorded   hepatitis B vaccine 10/05/2007 Recorded   Health Maintenance  Health Maintenance  ???Pending?(in the next year)  ??? ??OverDue  ??? ? ? ?Alcohol Misuse Screening due??01/02/21??and every 1??year(s)  ??? ??Due?  ??? ? ? ?Aspirin Therapy for CVD Prevention due??08/06/21??and every 1??year(s)  ??? ? ? ?Zoster Vaccine due??08/06/21??Unknown Frequency  ??? ??Due In Future?  ??? ? ? ?Influenza Vaccine not due until??10/01/21??and every 1??day(s)  ??? ? ? ?Obesity Screening not due until??01/01/22??and every 1??year(s)  ??? ? ? ?Smoking Cessation not due until??01/01/22??and every 1??year(s)  ??? ? ? ?Diabetes Screening not due until??01/04/22??and every 1??year(s)  ??? ? ? ?Hypertension Management-BMP not due until??01/04/22??and every 1??year(s)  ??? ? ? ?Colorectal Screening not due until??01/13/22??and every 1??year(s)  ???Satisfied?(in the past 1 year)  ??? ??Satisfied?  ??? ? ? ?ADL Screening on??08/06/21.??Satisfied by Uday Chapman  ??? ? ? ?Blood Pressure Screening on??08/06/21.??Satisfied by Uday Chapman  ??? ? ? ?Body Mass Index Check on??08/06/21.??Satisfied by Uday Chapman  ??? ? ? ?Colorectal Screening on??01/13/21.??Satisfied by Anthony Pedraza  ??? ? ? ?Depression Screening on??08/06/21.??Satisfied by Uday Chapman  ??? ? ? ?Diabetes Screening on??01/04/21.??Satisfied by Chas Francisco  ??? ? ? ?Hypertension Management-Blood Pressure on??08/06/21.??Satisfied by Uday Chapman  ??? ? ? ?Influenza Vaccine on??01/04/21.??Satisfied by Uday Chapman  ??? ? ? ?Obesity  Screening on??08/06/21.??Satisfied by Uday Chapman  ??? ? ? ?Smoking Cessation on??08/06/21.??Satisfied by Uday Chapman  ?  anal pap 1/21 ASCUS  anal ct/gc 5/19 neg  oral ct/gc 5/19 neg  urine ct/gc 7/20 neg  ophth 1/21  colon cancer screen 6/19 FIT neg, 1/21 neg  Lab Results  Test Name Test Result Date/Time Comments   Creatinine 3.48 mg/dL (High) 01/04/2021 12:33 CST    eGFR-AA 24 (Low) 01/04/2021 12:33 CST    AST 16 unit/L 01/04/2021 12:33 CST    ALT 16 unit/L 01/04/2021 12:33 CST    Occult Bld IA Negative UA 01/13/2021 07:00 CST    WBC 9.2 x10(3)/mcL 01/04/2021 12:33 CST    Hgb 11.8 gm/dL (Low) 01/04/2021 12:33 CST    Hct 39.3 % (Low) 01/04/2021 12:33 CST    Platelet 244 x10(3)/mcL 01/04/2021 12:33 CST    CD4 Pct 16.2 % (Low) 01/04/2021 12:33 CST    CD4 Absolute 373 unit/L (Low) 01/04/2021 12:33 CST    RPR Non-Reactive 07/17/2020 12:03 CDT    Hep C Qnt-LC HCV Not Detected 01/04/2021 12:33 CST    HIV1 RNA PCR-LC <20 cpy/mL 01/04/2021 12:33 CST HIV-1 RNA detected  ?  The reportable range for this assay is 20 to 10,000,000  copies HIV-1 RNA/mL.   Panel A Spot Count 1 07/17/2020 12:03 CDT    Panel B Spot Count 1 07/17/2020 12:03 CDT       [1]?Office Visit Note; Geovanna Self 01/04/2021 11:44 CST

## 2022-05-10 DIAGNOSIS — K11.8 MASS OF RIGHT PAROTID GLAND: Primary | ICD-10-CM

## 2022-05-11 ENCOUNTER — TELEPHONE (OUTPATIENT)
Dept: INTERVENTIONAL RADIOLOGY/VASCULAR | Facility: HOSPITAL | Age: 57
End: 2022-05-11
Payer: MEDICAID

## 2022-05-18 ENCOUNTER — HOSPITAL ENCOUNTER (OUTPATIENT)
Dept: INTERVENTIONAL RADIOLOGY/VASCULAR | Facility: HOSPITAL | Age: 57
Discharge: HOME | End: 2022-05-18
Attending: OTOLARYNGOLOGY
Payer: MEDICAID

## 2022-05-18 DIAGNOSIS — R22.1 NECK MASS: ICD-10-CM

## 2022-05-18 PROCEDURE — 87070 CULTURE OTHR SPECIMN AEROBIC: CPT | Performed by: OTOLARYNGOLOGY

## 2022-05-18 PROCEDURE — 10005 FNA BX W/US GDN 1ST LES: CPT

## 2022-05-18 PROCEDURE — 25000003 PHARM REV CODE 250: Performed by: PREVENTIVE MEDICINE

## 2022-05-18 RX ORDER — LIDOCAINE HYDROCHLORIDE 20 MG/ML
INJECTION, SOLUTION EPIDURAL; INFILTRATION; INTRACAUDAL; PERINEURAL CODE/TRAUMA/SEDATION MEDICATION
Status: COMPLETED | OUTPATIENT
Start: 2022-05-18 | End: 2022-05-18

## 2022-05-18 RX ADMIN — LIDOCAINE HYDROCHLORIDE 4 ML: 20 INJECTION, SOLUTION EPIDURAL; INFILTRATION; INTRACAUDAL; PERINEURAL at 11:05

## 2022-05-19 LAB
ESTROGEN SERPL-MCNC: NORMAL PG/ML
INSULIN SERPL-ACNC: NORMAL U[IU]/ML
LAB AP CLINICAL INFORMATION: NORMAL
LAB AP EBUS/EUS SPECIMEN: NORMAL
LAB AP GROSS DESCRIPTION: NORMAL
LAB AP PATHOLOGIST ADEQUACY INTERP: NORMAL

## 2022-05-23 LAB — BACTERIA FLD CULT: NORMAL

## 2022-06-16 ENCOUNTER — TELEPHONE (OUTPATIENT)
Dept: INFECTIOUS DISEASES | Facility: CLINIC | Age: 57
End: 2022-06-16
Payer: MEDICAID

## 2022-06-16 DIAGNOSIS — R22.1 NECK MASS: Primary | ICD-10-CM

## 2022-06-16 NOTE — TELEPHONE ENCOUNTER
Chart reviewed, FNA cytology resulted that the mass is a simple cyst.  My apologies that he did not receive the results sooner.  If it is still bothersome, we can refer again to ENT to remove it.  Otherwise, no additional intervention required at this time. Please notify pt. Thank you.

## 2022-06-16 NOTE — TELEPHONE ENCOUNTER
Geovanna I called and spoke with pt who stated he had FNA on 5/18/2022 but has yet receive call for results or appt. Please advise

## 2022-06-16 NOTE — TELEPHONE ENCOUNTER
----- Message from Marilyn Villegas sent at 6/16/2022  8:19 AM CDT -----  Regarding: pT CALLED  #JONATHAN PT#    Pt called would like to speak to someone about the lump on his neck [phone]

## 2022-06-17 NOTE — TELEPHONE ENCOUNTER
Mr Christianson called back. Gave him path report. He reports before byos mass was the size of a grape now its the size of a small baseball. It is painful to touch or turning his neck. Denies problems swallowing or breathing.   I did inform pt Geovanna would be back in clinic next week and she will probably refer him to ENT.  He verbalized understanding.

## 2022-06-20 ENCOUNTER — TELEPHONE (OUTPATIENT)
Dept: INFECTIOUS DISEASES | Facility: CLINIC | Age: 57
End: 2022-06-20
Payer: MEDICAID

## 2022-06-20 NOTE — TELEPHONE ENCOUNTER
Geovanna would you still like to refer pt to ENT as mass is getting bigger with some discomfort. Please advise

## 2022-06-20 NOTE — TELEPHONE ENCOUNTER
----- Message from Ene Tnag sent at 6/20/2022  9:06 AM CDT -----  Regarding: [facility] ID: Jordin CHOIILIE PT       Pt called regarding the mass in his neck. He stated that it is getting bigger. Please call back  @ [phone]

## 2022-06-21 ENCOUNTER — HOSPITAL ENCOUNTER (EMERGENCY)
Facility: HOSPITAL | Age: 57
Discharge: LEFT BEFORE DISCHARGE | End: 2022-06-21
Attending: INTERNAL MEDICINE
Payer: MEDICAID

## 2022-06-21 VITALS
OXYGEN SATURATION: 99 % | WEIGHT: 194 LBS | HEART RATE: 76 BPM | RESPIRATION RATE: 15 BRPM | HEIGHT: 73 IN | BODY MASS INDEX: 25.71 KG/M2 | DIASTOLIC BLOOD PRESSURE: 85 MMHG | SYSTOLIC BLOOD PRESSURE: 135 MMHG | TEMPERATURE: 98 F

## 2022-06-21 DIAGNOSIS — R22.1 NECK MASS: ICD-10-CM

## 2022-06-21 DIAGNOSIS — N17.9 ACUTE KIDNEY INJURY SUPERIMPOSED ON CHRONIC KIDNEY DISEASE: ICD-10-CM

## 2022-06-21 DIAGNOSIS — N17.9 ACUTE RENAL FAILURE SUPERIMPOSED ON STAGE 3 CHRONIC KIDNEY DISEASE, UNSPECIFIED ACUTE RENAL FAILURE TYPE, UNSPECIFIED WHETHER STAGE 3A OR 3B CKD: Primary | ICD-10-CM

## 2022-06-21 DIAGNOSIS — R22.1 MASS OF LATERAL NECK: Primary | ICD-10-CM

## 2022-06-21 DIAGNOSIS — N18.30 ACUTE RENAL FAILURE SUPERIMPOSED ON STAGE 3 CHRONIC KIDNEY DISEASE, UNSPECIFIED ACUTE RENAL FAILURE TYPE, UNSPECIFIED WHETHER STAGE 3A OR 3B CKD: Primary | ICD-10-CM

## 2022-06-21 DIAGNOSIS — N18.9 ACUTE KIDNEY INJURY SUPERIMPOSED ON CHRONIC KIDNEY DISEASE: ICD-10-CM

## 2022-06-21 DIAGNOSIS — B20 HIV DISEASE: ICD-10-CM

## 2022-06-21 PROBLEM — E66.9 OBESITY: Status: ACTIVE | Noted: 2022-06-21

## 2022-06-21 LAB
ALBUMIN SERPL-MCNC: 3.6 GM/DL (ref 3.5–5)
ALBUMIN SERPL-MCNC: 3.9 GM/DL (ref 3.5–5)
ALBUMIN/GLOB SERPL: 0.8 RATIO (ref 1.1–2)
ALBUMIN/GLOB SERPL: 1.1 RATIO (ref 1.1–2)
ALP SERPL-CCNC: 82 UNIT/L (ref 40–150)
ALP SERPL-CCNC: 85 UNIT/L (ref 40–150)
ALT SERPL-CCNC: 10 UNIT/L (ref 0–55)
ALT SERPL-CCNC: 11 UNIT/L (ref 0–55)
AST SERPL-CCNC: 8 UNIT/L (ref 5–34)
AST SERPL-CCNC: 9 UNIT/L (ref 5–34)
BASOPHILS # BLD AUTO: 0.06 X10(3)/MCL (ref 0–0.2)
BASOPHILS NFR BLD AUTO: 0.5 %
BILIRUBIN DIRECT+TOT PNL SERPL-MCNC: 0.3 MG/DL
BILIRUBIN DIRECT+TOT PNL SERPL-MCNC: 0.3 MG/DL
BUN SERPL-MCNC: 52.6 MG/DL (ref 8.4–25.7)
BUN SERPL-MCNC: 58.5 MG/DL (ref 8.4–25.7)
CALCIUM SERPL-MCNC: 9.6 MG/DL (ref 8.4–10.2)
CALCIUM SERPL-MCNC: 9.8 MG/DL (ref 8.4–10.2)
CHLORIDE SERPL-SCNC: 109 MMOL/L (ref 98–107)
CHLORIDE SERPL-SCNC: 109 MMOL/L (ref 98–107)
CO2 SERPL-SCNC: 21 MMOL/L (ref 22–29)
CO2 SERPL-SCNC: 22 MMOL/L (ref 22–29)
CREAT SERPL-MCNC: 3.85 MG/DL (ref 0.73–1.18)
CREAT SERPL-MCNC: 4.41 MG/DL (ref 0.73–1.18)
CRP SERPL-MCNC: >48 MG/L
EOSINOPHIL # BLD AUTO: 0.11 X10(3)/MCL (ref 0–0.9)
EOSINOPHIL NFR BLD AUTO: 0.9 %
ERYTHROCYTE [DISTWIDTH] IN BLOOD BY AUTOMATED COUNT: 16.2 % (ref 11.5–17)
ERYTHROCYTE [SEDIMENTATION RATE] IN BLOOD: 119 MM/HR (ref 0–15)
GLOBULIN SER-MCNC: 3.5 GM/DL (ref 2.4–3.5)
GLOBULIN SER-MCNC: 4.3 GM/DL (ref 2.4–3.5)
GLUCOSE SERPL-MCNC: 116 MG/DL (ref 74–100)
GLUCOSE SERPL-MCNC: 94 MG/DL (ref 74–100)
HCT VFR BLD AUTO: 33.9 % (ref 42–52)
HGB BLD-MCNC: 10.3 GM/DL (ref 14–18)
IMM GRANULOCYTES # BLD AUTO: 0.05 X10(3)/MCL (ref 0–0.02)
IMM GRANULOCYTES NFR BLD AUTO: 0.4 % (ref 0–0.43)
LYMPHOCYTES # BLD AUTO: 1.8 X10(3)/MCL (ref 0.6–4.6)
LYMPHOCYTES NFR BLD AUTO: 14.6 %
MAGNESIUM SERPL-MCNC: 2.6 MG/DL (ref 1.6–2.6)
MCH RBC QN AUTO: 22.6 PG (ref 27–31)
MCHC RBC AUTO-ENTMCNC: 30.4 MG/DL (ref 33–36)
MCV RBC AUTO: 74.5 FL (ref 80–94)
MONOCYTES # BLD AUTO: 1.08 X10(3)/MCL (ref 0.1–1.3)
MONOCYTES NFR BLD AUTO: 8.8 %
NEUTROPHILS # BLD AUTO: 9.2 X10(3)/MCL (ref 2.1–9.2)
NEUTROPHILS NFR BLD AUTO: 74.8 %
NRBC BLD AUTO-RTO: 0 %
PHOSPHATE SERPL-MCNC: 3.9 MG/DL (ref 2.3–4.7)
PLATELET # BLD AUTO: 280 X10(3)/MCL (ref 130–400)
PMV BLD AUTO: 10.7 FL (ref 9.4–12.4)
POTASSIUM SERPL-SCNC: 4.8 MMOL/L (ref 3.5–5.1)
POTASSIUM SERPL-SCNC: 4.9 MMOL/L (ref 3.5–5.1)
PROT SERPL-MCNC: 7.4 GM/DL (ref 6.4–8.3)
PROT SERPL-MCNC: 7.9 GM/DL (ref 6.4–8.3)
RBC # BLD AUTO: 4.55 X10(6)/MCL (ref 4.7–6.1)
SODIUM SERPL-SCNC: 140 MMOL/L (ref 136–145)
SODIUM SERPL-SCNC: 141 MMOL/L (ref 136–145)
STREP A PCR (OHS): NOT DETECTED
WBC # SPEC AUTO: 12.3 X10(3)/MCL (ref 4.5–11.5)

## 2022-06-21 PROCEDURE — 36415 COLL VENOUS BLD VENIPUNCTURE: CPT | Performed by: INTERNAL MEDICINE

## 2022-06-21 PROCEDURE — 85025 COMPLETE CBC W/AUTO DIFF WBC: CPT | Performed by: INTERNAL MEDICINE

## 2022-06-21 PROCEDURE — 25000003 PHARM REV CODE 250: Performed by: STUDENT IN AN ORGANIZED HEALTH CARE EDUCATION/TRAINING PROGRAM

## 2022-06-21 PROCEDURE — 96361 HYDRATE IV INFUSION ADD-ON: CPT

## 2022-06-21 PROCEDURE — 80053 COMPREHEN METABOLIC PANEL: CPT | Performed by: STUDENT IN AN ORGANIZED HEALTH CARE EDUCATION/TRAINING PROGRAM

## 2022-06-21 PROCEDURE — 80053 COMPREHEN METABOLIC PANEL: CPT | Performed by: INTERNAL MEDICINE

## 2022-06-21 PROCEDURE — A9577 INJ MULTIHANCE: HCPCS

## 2022-06-21 PROCEDURE — 25500020 PHARM REV CODE 255

## 2022-06-21 PROCEDURE — 87631 RESP VIRUS 3-5 TARGETS: CPT | Performed by: INTERNAL MEDICINE

## 2022-06-21 PROCEDURE — 99285 EMERGENCY DEPT VISIT HI MDM: CPT | Mod: 25

## 2022-06-21 PROCEDURE — 96360 HYDRATION IV INFUSION INIT: CPT

## 2022-06-21 PROCEDURE — 63600175 PHARM REV CODE 636 W HCPCS: Performed by: INTERNAL MEDICINE

## 2022-06-21 PROCEDURE — 83735 ASSAY OF MAGNESIUM: CPT | Performed by: INTERNAL MEDICINE

## 2022-06-21 PROCEDURE — 85651 RBC SED RATE NONAUTOMATED: CPT | Performed by: INTERNAL MEDICINE

## 2022-06-21 PROCEDURE — 86140 C-REACTIVE PROTEIN: CPT | Performed by: INTERNAL MEDICINE

## 2022-06-21 PROCEDURE — 84100 ASSAY OF PHOSPHORUS: CPT | Performed by: INTERNAL MEDICINE

## 2022-06-21 RX ORDER — SODIUM CHLORIDE, SODIUM LACTATE, POTASSIUM CHLORIDE, CALCIUM CHLORIDE 600; 310; 30; 20 MG/100ML; MG/100ML; MG/100ML; MG/100ML
1000 INJECTION, SOLUTION INTRAVENOUS
Status: COMPLETED | OUTPATIENT
Start: 2022-06-21 | End: 2022-06-21

## 2022-06-21 RX ORDER — SODIUM CHLORIDE 9 MG/ML
1000 INJECTION, SOLUTION INTRAVENOUS
Status: COMPLETED | OUTPATIENT
Start: 2022-06-21 | End: 2022-06-21

## 2022-06-21 RX ADMIN — SODIUM CHLORIDE 1000 ML: 9 INJECTION, SOLUTION INTRAVENOUS at 02:06

## 2022-06-21 RX ADMIN — GADOBENATE DIMEGLUMINE 20 ML: 529 INJECTION, SOLUTION INTRAVENOUS at 02:06

## 2022-06-21 RX ADMIN — SODIUM CHLORIDE, POTASSIUM CHLORIDE, SODIUM LACTATE AND CALCIUM CHLORIDE 1000 ML: 600; 310; 30; 20 INJECTION, SOLUTION INTRAVENOUS at 11:06

## 2022-06-21 NOTE — TELEPHONE ENCOUNTER
Pt came to ID clinic with pain to cyst on neck area. Upon assessment, area is very hard, warm to touch, painful to touch and now the size of a tennis ball. I instructed pt to go to ER for evaluation as no provider is present. Pt agrees with ER evaluation. Pt was escorted by Shae Tirado RN

## 2022-06-21 NOTE — LETTER
Patient: Ralf Christianson  YOB: 1965  Date: 6/21/2022 Time: 4:25 PM  Location: [facility]    Leaving the Hospital Against Medical Advice    Chart #:[id number]    This will certify that I, the undersigned,    ______________________________________________________________________    A patient in the above named medical center, having requested discharge and removal from the medical center against the advice of my attending physician(s), hereby release [facility], its physicians, officers and employees, severally and individually, from any and all liability of any nature whatsoever for any injury or harm or complication of any kind that may result directly or indirectly, by reason of my terminating my stay as a patient at [facility] and my departure from [facility], and hereby waive any and all rights of action I may now have or later acquire as a result of my voluntary departure from [facility] and the termination of my stay as a patient therein.    This release is made with the full knowledge of the danger that may result from the action which I am taking.      Date:_______________________                         ___________________________                                                                                    Patient/Legal Representative    Witness:        ____________________________                          ___________________________  Nurse                                                                        Physician

## 2022-06-21 NOTE — ED PROVIDER NOTES
Encounter Date: 6/21/2022       History     Chief Complaint   Patient presents with    Facial Swelling     PT W SWELLING TO RT JAW AND NECK WORSE X 4 DAYS.  PT REPORTS RECENT BIOPSY ON CYST 3 WKS. AGO.  NO RESP. ISSUES REPORTED. HURTS TO SWALLOW.  UNSURE OF FEVER.  REPORTS WT LOSS.      Right neck area swelling after biopsy 3 weeks ago. States was told biopsy was  Negative for malignancy.    The history is provided by the patient.     Review of patient's allergies indicates:  No Known Allergies  Past Medical History:   Diagnosis Date    Human immunodeficiency virus (HIV) disease     Hypertension     Renal disorder      No past surgical history on file.  No family history on file.  Social History     Tobacco Use    Smoking status: Current Every Day Smoker     Packs/day: 0.50     Types: Cigarettes    Smokeless tobacco: Never Used     Review of Systems   Constitutional: Negative for fever.   HENT: Negative for sore throat.    Respiratory: Negative for shortness of breath.    Cardiovascular: Negative for chest pain.   Gastrointestinal: Negative for nausea.   Genitourinary: Negative for dysuria.   Musculoskeletal: Negative for back pain.   Skin: Negative for rash.   Neurological: Negative for weakness.   Hematological: Does not bruise/bleed easily.   All other systems reviewed and are negative.      Physical Exam     Initial Vitals [06/21/22 0820]   BP Pulse Resp Temp SpO2   126/82 71 16 99 °F (37.2 °C) 100 %      MAP       --         Physical Exam    Nursing note and vitals reviewed.  Constitutional: He appears well-developed and well-nourished. No distress.   HENT:   Head: Atraumatic.   Right Ear: External ear normal.   Left Ear: External ear normal.   Mouth/Throat: Oropharynx is clear and moist.   Right infra-auricular mass extending to the lateral neck, around 4 cm diameter, solid and tender   Eyes: EOM are normal. Pupils are equal, round, and reactive to light.   Neck: Neck supple.   Normal range of  motion.  Cardiovascular: Regular rhythm, normal heart sounds and intact distal pulses.   Pulmonary/Chest: Breath sounds normal. No respiratory distress.   Abdominal: Abdomen is soft. Bowel sounds are normal. He exhibits no distension. There is no abdominal tenderness. There is no rebound and no guarding.   Musculoskeletal:         General: No edema. Normal range of motion.      Cervical back: Normal range of motion and neck supple.     Neurological: He is alert and oriented to person, place, and time. He has normal strength.   Skin: Skin is warm and dry. No rash noted.   Psychiatric: His behavior is normal.         ED Course   Procedures  Labs Reviewed   COMPREHENSIVE METABOLIC PANEL - Abnormal; Notable for the following components:       Result Value    Chloride 109 (*)     Glucose Level 116 (*)     Blood Urea Nitrogen 58.5 (*)     Creatinine 4.41 (*)     All other components within normal limits   CBC WITH DIFFERENTIAL - Abnormal; Notable for the following components:    WBC 12.3 (*)     RBC 4.55 (*)     Hgb 10.3 (*)     Hct 33.9 (*)     MCV 74.5 (*)     MCH 22.6 (*)     MCHC 30.4 (*)     IG# 0.05 (*)     All other components within normal limits   MAGNESIUM - Normal   PHOSPHORUS - Normal   CBC W/ AUTO DIFFERENTIAL    Narrative:     The following orders were created for panel order CBC auto differential.  Procedure                               Abnormality         Status                     ---------                               -----------         ------                     CBC with Differential[[id number]]        Abnormal            Final result                 Please view results for these tests on the individual orders.   EXTRA TUBES    Narrative:     The following orders were created for panel order EXTRA TUBES.  Procedure                               Abnormality         Status                     ---------                               -----------         ------                     Light Blue Top  Hold[[id number]]                              In process                 Gold Top Hold[[id number]]                                    In process                   Please view results for these tests on the individual orders.   LIGHT BLUE TOP HOLD   GOLD TOP HOLD   STREP GROUP A BY PCR   C-REACTIVE PROTEIN   SEDIMENTATION RATE, AUTOMATED          Imaging Results          CT Soft Tissue Neck WO Contrast (In process)                  Medications   lactated ringers infusion (1,000 mLs Intravenous New Bag 6/21/22 1125)     Medical Decision Making:   History:   Old Medical Records: I decided to obtain old medical records.  Initial Assessment:   IMPRESSION  1.  Large multiseptated and mildly complex-appearing cystic mass  arising from or immediately adjacent to the inferior right parotid  gland.  2.  Scattered bilateral lymph nodes are of grossly normal sonographic  appearance.      Electronically Signed By: Baldo De Leon MD  Date/Time Signed: 11/03/2021 19:06                    12:14 PM Consult: I discussed the case with Dr. BHAVYA Finney (ENT). Agrees with current management. Recommends MRI of lesion, ENT service will F/U as outpatient in clinic due to chronicity of lesion without acute emergent complications.    Clinical Impression:   Final diagnoses:  [R22.1] Mass of lateral neck (Primary)  [N17.9, N18.9] Acute kidney injury superimposed on chronic kidney disease          ED Disposition Condition    Observation               Ge Bass MD  06/21/22 6543
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
## 2022-06-23 ENCOUNTER — OFFICE VISIT (OUTPATIENT)
Dept: OTOLARYNGOLOGY | Facility: CLINIC | Age: 57
End: 2022-06-23
Payer: MEDICAID

## 2022-06-23 VITALS
DIASTOLIC BLOOD PRESSURE: 68 MMHG | HEART RATE: 71 BPM | SYSTOLIC BLOOD PRESSURE: 112 MMHG | RESPIRATION RATE: 16 BRPM | TEMPERATURE: 98 F | HEIGHT: 73 IN | BODY MASS INDEX: 25.71 KG/M2 | WEIGHT: 194 LBS

## 2022-06-23 DIAGNOSIS — K11.8 MASS OF RIGHT PAROTID GLAND: Primary | ICD-10-CM

## 2022-06-23 PROCEDURE — 99214 OFFICE O/P EST MOD 30 MIN: CPT | Mod: S$PBB,,, | Performed by: NURSE PRACTITIONER

## 2022-06-23 PROCEDURE — 1159F MED LIST DOCD IN RCRD: CPT | Mod: CPTII,,, | Performed by: NURSE PRACTITIONER

## 2022-06-23 PROCEDURE — 3078F DIAST BP <80 MM HG: CPT | Mod: CPTII,,, | Performed by: NURSE PRACTITIONER

## 2022-06-23 PROCEDURE — 99214 OFFICE O/P EST MOD 30 MIN: CPT | Mod: PBBFAC | Performed by: NURSE PRACTITIONER

## 2022-06-23 PROCEDURE — 3078F PR MOST RECENT DIASTOLIC BLOOD PRESSURE < 80 MM HG: ICD-10-PCS | Mod: CPTII,,, | Performed by: NURSE PRACTITIONER

## 2022-06-23 PROCEDURE — 1159F PR MEDICATION LIST DOCUMENTED IN MEDICAL RECORD: ICD-10-PCS | Mod: CPTII,,, | Performed by: NURSE PRACTITIONER

## 2022-06-23 PROCEDURE — 3008F PR BODY MASS INDEX (BMI) DOCUMENTED: ICD-10-PCS | Mod: CPTII,,, | Performed by: NURSE PRACTITIONER

## 2022-06-23 PROCEDURE — 99214 PR OFFICE/OUTPT VISIT, EST, LEVL IV, 30-39 MIN: ICD-10-PCS | Mod: S$PBB,,, | Performed by: NURSE PRACTITIONER

## 2022-06-23 PROCEDURE — 1160F RVW MEDS BY RX/DR IN RCRD: CPT | Mod: CPTII,,, | Performed by: NURSE PRACTITIONER

## 2022-06-23 PROCEDURE — 3074F PR MOST RECENT SYSTOLIC BLOOD PRESSURE < 130 MM HG: ICD-10-PCS | Mod: CPTII,,, | Performed by: NURSE PRACTITIONER

## 2022-06-23 PROCEDURE — 3074F SYST BP LT 130 MM HG: CPT | Mod: CPTII,,, | Performed by: NURSE PRACTITIONER

## 2022-06-23 PROCEDURE — 3008F BODY MASS INDEX DOCD: CPT | Mod: CPTII,,, | Performed by: NURSE PRACTITIONER

## 2022-06-23 PROCEDURE — 1160F PR REVIEW ALL MEDS BY PRESCRIBER/CLIN PHARMACIST DOCUMENTED: ICD-10-PCS | Mod: CPTII,,, | Performed by: NURSE PRACTITIONER

## 2022-06-23 RX ORDER — DOLUTEGRAVIR SODIUM 50 MG/1
1 TABLET, FILM COATED ORAL DAILY
COMMUNITY
Start: 2022-06-13 | End: 2022-07-12 | Stop reason: SDUPTHER

## 2022-06-23 RX ORDER — ALLOPURINOL 100 MG/1
TABLET ORAL
COMMUNITY
Start: 2022-05-18

## 2022-06-23 RX ORDER — DARUNAVIR ETHANOLATE AND COBICISTAT 800; 150 MG/1; MG/1
1 TABLET, FILM COATED ORAL DAILY
COMMUNITY
Start: 2022-06-13 | End: 2022-07-12 | Stop reason: SDUPTHER

## 2022-06-23 RX ORDER — AMLODIPINE BESYLATE 10 MG/1
10 TABLET ORAL DAILY
COMMUNITY
Start: 2022-06-13 | End: 2022-10-04

## 2022-06-23 RX ORDER — RILPIVIRINE HYDROCHLORIDE 25 MG/1
TABLET, FILM COATED ORAL
COMMUNITY
Start: 2022-06-13 | End: 2022-07-12 | Stop reason: SDUPTHER

## 2022-06-23 RX ORDER — ATORVASTATIN CALCIUM 20 MG/1
20 TABLET, FILM COATED ORAL DAILY
COMMUNITY
Start: 2022-06-13 | End: 2022-10-04

## 2022-06-23 RX ORDER — ERGOCALCIFEROL 1.25 MG/1
CAPSULE ORAL
COMMUNITY
Start: 2022-06-13 | End: 2022-10-04

## 2022-06-23 RX ORDER — METOPROLOL SUCCINATE 25 MG/1
25 TABLET, EXTENDED RELEASE ORAL DAILY
COMMUNITY
Start: 2022-06-13 | End: 2022-11-30 | Stop reason: SDUPTHER

## 2022-06-23 NOTE — PROGRESS NOTES
[facility]  Otolaryngology Clinic Note    Ralf Christianson  Encounter Date: 6/23/2022  YOB: 1965    Chief Complaint: neck swelling    HPI: 06/23/2022: 57yoM referred for right parotid lesion. He states this has been present for many years. He had an FNA in IR 5/18/22 (benign path) and reports that it began swelling about 3 weeks later. He denies fever, chills, weight loss or drainage from site. Denies continued growth. He reports some initial pain to site associated with swelling but he is no longer having any pain. He endorses mild dry mouth. He is a smoker of 5 cigarettes daily for 35 years. Denies dysphonia, dysphagia, otalgia, or SOB.    ROS:   General: Negative except per HPI  Skin: Denies rash, ulcer, or lesion.  Eyes: Denies vision changes or diplopia.  Ears: Negative except per HPI  Nose: Negative except per HPI  Throat/mouth: Negative except per HPI  Cardiovascular: Negative except per HPI  Respiratory: Negative except per HPI  Neck: Negative except per HPI  Endocrine: Negative except per HPI  Neurologic: Negative except per HPI    Other 10-point review of systems negative except per HPI      Review of patient's allergies indicates:  No Known Allergies    Past Medical History:   Diagnosis Date    Human immunodeficiency virus (HIV) disease     Hypertension     Renal disorder        History reviewed. No pertinent surgical history.    Social History     Socioeconomic History    Marital status: Single   Tobacco Use    Smoking status: Current Every Day Smoker     Packs/day: 0.50     Types: Cigarettes    Smokeless tobacco: Never Used       History reviewed. No pertinent family history.    Outpatient Encounter Medications as of 6/23/2022   Medication Sig Dispense Refill    allopurinoL (ZYLOPRIM) 100 MG tablet TAKE one-half (50 MG) tablet BY MOUTH EVERY DAY      amLODIPine (NORVASC) 10 MG tablet Take 10 mg by mouth once daily.      atorvastatin (LIPITOR) 20 MG tablet Take 20  "mg by mouth once daily.      EDURANT 25 mg Tab TAKE ONE TABLET BY MOUTH DAILY WITH FOOD      ergocalciferol (ERGOCALCIFEROL) 50,000 unit Cap TAKE ONE CAPSULE BY MOUTH every other week      metoprolol succinate (TOPROL-XL) 25 MG 24 hr tablet Take 25 mg by mouth once daily.      PREZCOBIX 800-150 mg-mg Tab tablet Take 1 tablet by mouth once daily.      TIVICAY 50 mg Tab Take 1 tablet by mouth once daily.       No facility-administered encounter medications on file as of 6/23/2022.       Physical Exam:  Vitals:    06/23/22 1221   BP: 112/68   BP Location: Right arm   Patient Position: Sitting   BP Method: Large (Automatic)   Pulse: 71   Resp: 16   Temp: 98 °F (36.7 °C)   Weight: 88 kg (194 lb)   Height: 6' 1" (1.854 m)       General: NAD, voice normal  Neuro: AAO, CN II - XII grossly intact  Head/ Face: NCAT, symmetric, sensations intact bilaterally  Eyes: EOMI, PERRL  Ears: externally normal with grossly normal hearing  AD: EAC patent, TM intact, no middle ear effusion, no retractions  AS: EAC patent, TM intact, no middle ear effusion, no retractions  Nose: bilateral nares patent, midline septum, no rhinorrhea, no external deformity, no turbinate hypertrophy  OC/OP: MMM, no intraoral lesions, no trismus, dentition is poor, no uvular deviation, bilaterally symmetric soft palate elevation, palatoglossus and palatopharyngeal fold wnl; tonsils are symmetric and 3+  Indirect laryngoscopy: deferred due to patient intolerance  Neck: supple, no palpable LAD, normal ROM, no thyromegaly. There is a ~5cm firm, nodular area to right tail of parotid which has minimal mobility. It is nontender, non erythematous, non fluctuant  Respiratory: nonlabored, no wheezing, bilateral chest rise  Cardiovascular: RRR  Gastrointestinal: S NT ND  Skin: warm, no lesions  Musculoskeletal: 5/5 strength  Psych: Appropriate affect/mood     Pertinent Data:  ? LABS:     ? AUDIO:              Imaging:   I personally reviewed the following " images:          Assessment/Plan:  57 y.o. male with 5.5cm right tail of parotid lesion. FNA 5/18/22 benign. Recent increase in size. 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 Pt expresses frustration, stating he wants something done, that the lesion is causing him embarrassment. D/w Kade Florian & Robbi & CT neck reviewed. Surgical intervention not recommended. Reassured that further workup will allow better planning.  - MRI neck w w/o  - Referral to IR for core biopsy  - RTC 4-6 weeks on Res template after MRI + IR     Krystal Sagastume NP

## 2022-06-23 NOTE — TELEPHONE ENCOUNTER
Chart reviewed.  He was sent home yesterday & seen in ENT clinic today.  MRI & IR for core biopsy orders placed by ENT NP.  Keep f/u with me as scheduled.

## 2022-06-29 ENCOUNTER — TELEPHONE (OUTPATIENT)
Dept: INTERVENTIONAL RADIOLOGY/VASCULAR | Facility: HOSPITAL | Age: 57
End: 2022-06-29

## 2022-07-06 RX ORDER — SODIUM CHLORIDE 0.9 % (FLUSH) 0.9 %
10 SYRINGE (ML) INJECTION
Status: CANCELLED | OUTPATIENT
Start: 2022-07-06

## 2022-07-07 ENCOUNTER — HOSPITAL ENCOUNTER (OUTPATIENT)
Dept: RADIOLOGY | Facility: HOSPITAL | Age: 57
Discharge: HOME | End: 2022-07-07
Attending: NURSE PRACTITIONER
Payer: MEDICAID

## 2022-07-07 ENCOUNTER — HOSPITAL ENCOUNTER (OUTPATIENT)
Dept: INTERVENTIONAL RADIOLOGY/VASCULAR | Facility: HOSPITAL | Age: 57
Discharge: HOME | End: 2022-07-07
Attending: NURSE PRACTITIONER
Payer: MEDICAID

## 2022-07-07 DIAGNOSIS — K11.8: ICD-10-CM

## 2022-07-07 DIAGNOSIS — K11.8 MASS OF RIGHT PAROTID GLAND: ICD-10-CM

## 2022-07-07 DIAGNOSIS — K11.8 NODULE OF PAROTID GLAND: ICD-10-CM

## 2022-07-07 PROCEDURE — 76942 ECHO GUIDE FOR BIOPSY: CPT | Mod: TC,59

## 2022-07-07 PROCEDURE — 76536 US EXAM OF HEAD AND NECK: CPT | Mod: TC

## 2022-07-08 ENCOUNTER — TELEPHONE (OUTPATIENT)
Dept: INFECTIOUS DISEASES | Facility: CLINIC | Age: 57
End: 2022-07-08
Payer: MEDICAID
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
## 2022-07-11 LAB
ESTROGEN SERPL-MCNC: NORMAL PG/ML
INSULIN SERPL-ACNC: NORMAL U[IU]/ML
LAB AP CLINICAL INFORMATION: NORMAL
LAB AP COMMENTS: NORMAL
LAB AP EBUS/EUS SPECIMEN: NORMAL
LAB AP GROSS DESCRIPTION: NORMAL

## 2022-07-12 ENCOUNTER — OFFICE VISIT (OUTPATIENT)
Dept: INFECTIOUS DISEASES | Facility: CLINIC | Age: 57
End: 2022-07-12
Payer: MEDICAID

## 2022-07-12 VITALS
HEIGHT: 73 IN | RESPIRATION RATE: 18 BRPM | SYSTOLIC BLOOD PRESSURE: 122 MMHG | DIASTOLIC BLOOD PRESSURE: 75 MMHG | TEMPERATURE: 98 F | WEIGHT: 202.13 LBS | BODY MASS INDEX: 26.79 KG/M2 | HEART RATE: 64 BPM

## 2022-07-12 DIAGNOSIS — N18.4 CKD (CHRONIC KIDNEY DISEASE) STAGE 4, GFR 15-29 ML/MIN: ICD-10-CM

## 2022-07-12 DIAGNOSIS — B20 HIV DISEASE: Primary | ICD-10-CM

## 2022-07-12 DIAGNOSIS — K11.8 MASS OF RIGHT PAROTID GLAND: ICD-10-CM

## 2022-07-12 DIAGNOSIS — Z86.19 HISTORY OF HEPATITIS C: ICD-10-CM

## 2022-07-12 DIAGNOSIS — F17.200 NEEDS SMOKING CESSATION EDUCATION: ICD-10-CM

## 2022-07-12 LAB
ALBUMIN SERPL-MCNC: 4.5 GM/DL (ref 3.5–5)
ALBUMIN/GLOB SERPL: 1.1 RATIO (ref 1.1–2)
ALP SERPL-CCNC: 92 UNIT/L (ref 40–150)
ALT SERPL-CCNC: 18 UNIT/L (ref 0–55)
APPEARANCE UR: CLEAR
AST SERPL-CCNC: 13 UNIT/L (ref 5–34)
BACTERIA #/AREA URNS AUTO: ABNORMAL /HPF
BILIRUB UR QL STRIP.AUTO: NEGATIVE MG/DL
BILIRUBIN DIRECT+TOT PNL SERPL-MCNC: 0.4 MG/DL
BUN SERPL-MCNC: 62.6 MG/DL (ref 8.4–25.7)
C TRACH DNA SPEC QL NAA+PROBE: NOT DETECTED
CALCIUM SERPL-MCNC: 10.2 MG/DL (ref 8.4–10.2)
CHLORIDE SERPL-SCNC: 104 MMOL/L (ref 98–107)
CHOLEST SERPL-MCNC: 132 MG/DL
CHOLEST/HDLC SERPL: 3 {RATIO} (ref 0–5)
CO2 SERPL-SCNC: 21 MMOL/L (ref 22–29)
COLOR UR AUTO: ABNORMAL
CREAT SERPL-MCNC: 5.1 MG/DL (ref 0.73–1.18)
DEPRECATED CALCIDIOL+CALCIFEROL SERPL-MC: 34 NG/ML (ref 30–80)
GLOBULIN SER-MCNC: 4 GM/DL (ref 2.4–3.5)
GLUCOSE SERPL-MCNC: 91 MG/DL (ref 74–100)
GLUCOSE UR QL STRIP.AUTO: NORMAL MG/DL
HDLC SERPL-MCNC: 39 MG/DL (ref 35–60)
HYALINE CASTS #/AREA URNS LPF: ABNORMAL /LPF
KETONES UR QL STRIP.AUTO: NEGATIVE MG/DL
LDLC SERPL CALC-MCNC: 74 MG/DL (ref 50–140)
LEUKOCYTE ESTERASE UR QL STRIP.AUTO: 25 UNIT/L
MUCOUS THREADS URNS QL MICRO: ABNORMAL /LPF
N GONORRHOEA DNA SPEC QL NAA+PROBE: NOT DETECTED
NITRITE UR QL STRIP.AUTO: NEGATIVE
PH UR STRIP.AUTO: 5.5 [PH]
POTASSIUM SERPL-SCNC: 5 MMOL/L (ref 3.5–5.1)
PROT SERPL-MCNC: 8.5 GM/DL (ref 6.4–8.3)
PROT UR QL STRIP.AUTO: ABNORMAL MG/DL
RBC #/AREA URNS AUTO: ABNORMAL /HPF
RBC UR QL AUTO: ABNORMAL UNIT/L
SODIUM SERPL-SCNC: 137 MMOL/L (ref 136–145)
SP GR UR STRIP.AUTO: 1.01
SPERM URNS QL MICRO: ABNORMAL /HPF
SQUAMOUS #/AREA URNS LPF: ABNORMAL /HPF
T PALLIDUM AB SER QL: REACTIVE
TRIGL SERPL-MCNC: 94 MG/DL (ref 34–140)
TSH SERPL-ACNC: 1.39 UIU/ML (ref 0.35–4.94)
UROBILINOGEN UR STRIP-ACNC: NORMAL MG/DL
VLDLC SERPL CALC-MCNC: 19 MG/DL
WBC #/AREA URNS AUTO: ABNORMAL /HPF

## 2022-07-12 PROCEDURE — 1160F RVW MEDS BY RX/DR IN RCRD: CPT | Mod: CPTII,,, | Performed by: NURSE PRACTITIONER

## 2022-07-12 PROCEDURE — 86780 TREPONEMA PALLIDUM: CPT | Performed by: NURSE PRACTITIONER

## 2022-07-12 PROCEDURE — 3008F BODY MASS INDEX DOCD: CPT | Mod: CPTII,,, | Performed by: NURSE PRACTITIONER

## 2022-07-12 PROCEDURE — 81001 URINALYSIS AUTO W/SCOPE: CPT | Performed by: NURSE PRACTITIONER

## 2022-07-12 PROCEDURE — 87491 CHLMYD TRACH DNA AMP PROBE: CPT | Performed by: NURSE PRACTITIONER

## 2022-07-12 PROCEDURE — 99214 OFFICE O/P EST MOD 30 MIN: CPT | Mod: PBBFAC | Performed by: NURSE PRACTITIONER

## 2022-07-12 PROCEDURE — 3078F DIAST BP <80 MM HG: CPT | Mod: CPTII,,, | Performed by: NURSE PRACTITIONER

## 2022-07-12 PROCEDURE — 86361 T CELL ABSOLUTE COUNT: CPT | Performed by: NURSE PRACTITIONER

## 2022-07-12 PROCEDURE — 80053 COMPREHEN METABOLIC PANEL: CPT | Performed by: NURSE PRACTITIONER

## 2022-07-12 PROCEDURE — 84443 ASSAY THYROID STIM HORMONE: CPT | Performed by: NURSE PRACTITIONER

## 2022-07-12 PROCEDURE — 1159F PR MEDICATION LIST DOCUMENTED IN MEDICAL RECORD: ICD-10-PCS | Mod: CPTII,,, | Performed by: NURSE PRACTITIONER

## 2022-07-12 PROCEDURE — 87522 HEPATITIS C REVRS TRNSCRPJ: CPT | Performed by: NURSE PRACTITIONER

## 2022-07-12 PROCEDURE — 36415 COLL VENOUS BLD VENIPUNCTURE: CPT | Performed by: NURSE PRACTITIONER

## 2022-07-12 PROCEDURE — 82306 VITAMIN D 25 HYDROXY: CPT | Performed by: NURSE PRACTITIONER

## 2022-07-12 PROCEDURE — 3078F PR MOST RECENT DIASTOLIC BLOOD PRESSURE < 80 MM HG: ICD-10-PCS | Mod: CPTII,,, | Performed by: NURSE PRACTITIONER

## 2022-07-12 PROCEDURE — 87088 URINE BACTERIA CULTURE: CPT | Performed by: NURSE PRACTITIONER

## 2022-07-12 PROCEDURE — 1159F MED LIST DOCD IN RCRD: CPT | Mod: CPTII,,, | Performed by: NURSE PRACTITIONER

## 2022-07-12 PROCEDURE — 3074F PR MOST RECENT SYSTOLIC BLOOD PRESSURE < 130 MM HG: ICD-10-PCS | Mod: CPTII,,, | Performed by: NURSE PRACTITIONER

## 2022-07-12 PROCEDURE — 3008F PR BODY MASS INDEX (BMI) DOCUMENTED: ICD-10-PCS | Mod: CPTII,,, | Performed by: NURSE PRACTITIONER

## 2022-07-12 PROCEDURE — 86592 SYPHILIS TEST NON-TREP QUAL: CPT | Performed by: NURSE PRACTITIONER

## 2022-07-12 PROCEDURE — 99214 PR OFFICE/OUTPT VISIT, EST, LEVL IV, 30-39 MIN: ICD-10-PCS | Mod: S$PBB,,, | Performed by: NURSE PRACTITIONER

## 2022-07-12 PROCEDURE — 1160F PR REVIEW ALL MEDS BY PRESCRIBER/CLIN PHARMACIST DOCUMENTED: ICD-10-PCS | Mod: CPTII,,, | Performed by: NURSE PRACTITIONER

## 2022-07-12 PROCEDURE — 87591 N.GONORRHOEAE DNA AMP PROB: CPT | Performed by: NURSE PRACTITIONER

## 2022-07-12 PROCEDURE — 3074F SYST BP LT 130 MM HG: CPT | Mod: CPTII,,, | Performed by: NURSE PRACTITIONER

## 2022-07-12 PROCEDURE — 80061 LIPID PANEL: CPT | Performed by: NURSE PRACTITIONER

## 2022-07-12 PROCEDURE — 99214 OFFICE O/P EST MOD 30 MIN: CPT | Mod: S$PBB,,, | Performed by: NURSE PRACTITIONER

## 2022-07-12 RX ORDER — DOLUTEGRAVIR SODIUM 50 MG/1
1 TABLET, FILM COATED ORAL DAILY
Qty: 30 TABLET | Refills: 3 | Status: SHIPPED | OUTPATIENT
Start: 2022-07-12 | End: 2023-01-19

## 2022-07-12 RX ORDER — DARUNAVIR ETHANOLATE AND COBICISTAT 800; 150 MG/1; MG/1
1 TABLET, FILM COATED ORAL DAILY
Qty: 30 TABLET | Refills: 3 | Status: SHIPPED | OUTPATIENT
Start: 2022-07-12 | End: 2023-01-19

## 2022-07-12 RX ORDER — RILPIVIRINE HYDROCHLORIDE 25 MG/1
1 TABLET, FILM COATED ORAL DAILY
Qty: 30 TABLET | Refills: 3 | Status: SHIPPED | OUTPATIENT
Start: 2022-07-12 | End: 2023-01-19

## 2022-07-12 RX ORDER — SILDENAFIL CITRATE 20 MG/1
TABLET ORAL
COMMUNITY
Start: 2022-03-18 | End: 2022-07-13 | Stop reason: SDUPTHER
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
## 2022-07-13 LAB
RPR SER QL: NORMAL
RPR SER QL: NORMAL
RPR SER-TITR: NORMAL {TITER}

## 2022-07-13 RX ORDER — SILDENAFIL CITRATE 20 MG/1
20 TABLET ORAL DAILY PRN
Qty: 10 TABLET | Refills: 2 | Status: SHIPPED | OUTPATIENT
Start: 2022-07-13 | End: 2023-06-26 | Stop reason: SDUPTHER

## 2022-07-13 NOTE — TELEPHONE ENCOUNTER
----- Message from Marilyn Villegas sent at 7/13/2022 12:40 PM CDT -----  Regarding: RX NEEDED  # JONATHAN PT#    RX Request by pt--sildenafil (REVATIO) 20 mg Tab (Viagra)    [facility]      Pt call back [phone]

## 2022-07-14 ENCOUNTER — TELEPHONE (OUTPATIENT)
Dept: INFECTIOUS DISEASES | Facility: CLINIC | Age: 57
End: 2022-07-14
Payer: MEDICAID

## 2022-07-14 LAB
BACTERIA UR CULT: NO GROWTH
GAMMA INTERFERON BACKGROUND BLD IA-ACNC: 0.22 IU/ML
HCV RNA SERPL NAA+PROBE-ACNC: NORMAL IU/ML
M TB IFN-G BLD-IMP: NEGATIVE
M TB IFN-G CD4+ BCKGRND COR BLD-ACNC: -0.16 IU/ML
M TB IFN-G CD4+CD8+ BCKGRND COR BLD-ACNC: -0.13 IU/ML
MITOGEN IGNF BCKGRD COR BLD-ACNC: 9.78 IU/ML

## 2022-07-14 NOTE — TELEPHONE ENCOUNTER
MRI 7/13/22 results reviewed.  Phoned pt with results as requested.  Strongly encouraged that he keep appt with ENT for additional interpretation of results & treatment plan.  Pt voiced understanding & appreciation.

## 2022-07-15 LAB — HIV1 RNA # PLAS NAA DL=20: NORMAL COPIES/ML

## 2022-07-16 LAB — T PALLIDUM AB SER QL: NON REACTIVE

## 2022-07-22 LAB
AGE: 55
CD3+CD4+ CELLS # BLD: 24.7 % (ref 28–48)
CD3+CD4+ CELLS # SPEC: 248.39 UNIT/L (ref 589–1505)
CD3+CD4+ CELLS NFR BLD: 12.4 %
LYMPHOCYTES # BLD AUTO: 2003.17 X10(3)/MCL (ref 1260–5520)
LYMPHOMA - T-CELL MARKERS SPEC-IMP: ABNORMAL
WBC # BLD AUTO: 8110 /MM3 (ref 4500–11500)

## 2022-07-28 ENCOUNTER — TELEPHONE (OUTPATIENT)
Dept: INFECTIOUS DISEASES | Facility: CLINIC | Age: 57
End: 2022-07-28
Payer: MEDICAID

## 2022-07-28 ENCOUNTER — OFFICE VISIT (OUTPATIENT)
Dept: NEPHROLOGY | Facility: CLINIC | Age: 57
End: 2022-07-28
Payer: MEDICAID

## 2022-07-28 VITALS
HEIGHT: 72 IN | DIASTOLIC BLOOD PRESSURE: 69 MMHG | SYSTOLIC BLOOD PRESSURE: 115 MMHG | BODY MASS INDEX: 27.5 KG/M2 | WEIGHT: 203 LBS | RESPIRATION RATE: 18 BRPM | TEMPERATURE: 98 F | HEART RATE: 58 BPM | OXYGEN SATURATION: 100 %

## 2022-07-28 DIAGNOSIS — Z72.0 TOBACCO ABUSE: ICD-10-CM

## 2022-07-28 DIAGNOSIS — D63.8 ANEMIA OF CHRONIC DISEASE: ICD-10-CM

## 2022-07-28 DIAGNOSIS — E87.20 METABOLIC ACIDOSIS: ICD-10-CM

## 2022-07-28 DIAGNOSIS — I10 PRIMARY HYPERTENSION: ICD-10-CM

## 2022-07-28 DIAGNOSIS — N18.5 CKD STAGE G5/A3, GFR <15 AND ALBUMIN CREATININE RATIO >300 MG/G: Primary | ICD-10-CM

## 2022-07-28 PROCEDURE — 3074F SYST BP LT 130 MM HG: CPT | Mod: CPTII,,, | Performed by: NURSE PRACTITIONER

## 2022-07-28 PROCEDURE — 99215 OFFICE O/P EST HI 40 MIN: CPT | Mod: PBBFAC | Performed by: NURSE PRACTITIONER

## 2022-07-28 PROCEDURE — 1159F MED LIST DOCD IN RCRD: CPT | Mod: CPTII,,, | Performed by: NURSE PRACTITIONER

## 2022-07-28 PROCEDURE — 99214 PR OFFICE/OUTPT VISIT, EST, LEVL IV, 30-39 MIN: ICD-10-PCS | Mod: S$PBB,,, | Performed by: NURSE PRACTITIONER

## 2022-07-28 PROCEDURE — 1160F PR REVIEW ALL MEDS BY PRESCRIBER/CLIN PHARMACIST DOCUMENTED: ICD-10-PCS | Mod: CPTII,,, | Performed by: NURSE PRACTITIONER

## 2022-07-28 PROCEDURE — 3074F PR MOST RECENT SYSTOLIC BLOOD PRESSURE < 130 MM HG: ICD-10-PCS | Mod: CPTII,,, | Performed by: NURSE PRACTITIONER

## 2022-07-28 PROCEDURE — 1159F PR MEDICATION LIST DOCUMENTED IN MEDICAL RECORD: ICD-10-PCS | Mod: CPTII,,, | Performed by: NURSE PRACTITIONER

## 2022-07-28 PROCEDURE — 3008F PR BODY MASS INDEX (BMI) DOCUMENTED: ICD-10-PCS | Mod: CPTII,,, | Performed by: NURSE PRACTITIONER

## 2022-07-28 PROCEDURE — 3008F BODY MASS INDEX DOCD: CPT | Mod: CPTII,,, | Performed by: NURSE PRACTITIONER

## 2022-07-28 PROCEDURE — 3078F PR MOST RECENT DIASTOLIC BLOOD PRESSURE < 80 MM HG: ICD-10-PCS | Mod: CPTII,,, | Performed by: NURSE PRACTITIONER

## 2022-07-28 PROCEDURE — 99214 OFFICE O/P EST MOD 30 MIN: CPT | Mod: S$PBB,,, | Performed by: NURSE PRACTITIONER

## 2022-07-28 PROCEDURE — 1160F RVW MEDS BY RX/DR IN RCRD: CPT | Mod: CPTII,,, | Performed by: NURSE PRACTITIONER

## 2022-07-28 PROCEDURE — 3066F NEPHROPATHY DOC TX: CPT | Mod: CPTII,,, | Performed by: NURSE PRACTITIONER

## 2022-07-28 PROCEDURE — 3078F DIAST BP <80 MM HG: CPT | Mod: CPTII,,, | Performed by: NURSE PRACTITIONER

## 2022-07-28 PROCEDURE — 3066F PR DOCUMENTATION OF TREATMENT FOR NEPHROPATHY: ICD-10-PCS | Mod: CPTII,,, | Performed by: NURSE PRACTITIONER

## 2022-07-28 NOTE — PROGRESS NOTES
"[facility]  Nephrology Clinic Note   Chief Complaint   Patient presents with    Chronic Kidney Disease     New pt, referred, take meds at night, no family hx of kidney disease      History of Present Illness  Mr Ralf Christianson is a 57 y.o. Black or  male with past medical history of advanced CKD, hypertension, HIV, treated hepatitis-C, hyperlipidemia, and smoking. He was last seen by me in 2020, was lost to follow-up.  Tells me that he has been having difficulty with transportation due to the fact that he is homeless.  He currently lives with a friend in [place].  Patient presents to [facility] nephrology clinic today, reports compliance with medication regimen, denies complaints.      Review of Systems  Twelve point review of systems conducted, negative except as stated in history of present illness.    Review of patient's allergies indicates:  No Known Allergies    Past Medical History:   Past Medical History:   Diagnosis Date    Human immunodeficiency virus (HIV) disease     Hypertension     Renal disorder        Procedure History:   Past Surgical History:   Procedure Laterality Date    BIOPSY OF TISSUE OF NECK Right        Family History: family history includes Cancer in his mother; No Known Problems in his brother, brother, brother, brother, and father.    Social History:  reports that he has been smoking cigarettes. He has a 20.00 pack-year smoking history. He has never used smokeless tobacco. He reports that he does not drink alcohol and does not use drugs.    Physical Exam:   /69 (BP Location: Left arm, Patient Position: Sitting, BP Method: Large (Automatic))   Pulse (!) 58   Temp 97.9 °F (36.6 °C) (Oral)   Resp 18   Ht 6' 0.24" (1.835 m)   Wt 92.1 kg (203 lb)   SpO2 100%   BMI 27.35 kg/m²     General appearance: Patient is in no acute distress.  Skin: No rashes or wounds.  HEENT: PERRLA, EOMI, no scleral icterus, no JVD. Neck is " supple.  Chest: Respirations are unlabored. Lungs sounds are clear.   Heart: S1, S2.   Abdomen: Benign.  : Deferred.  Extremities: No edema, peripheral pulses are palpable.   Neuro: No focal deficits.     Home Medications:    Current Outpatient Medications:     allopurinoL (ZYLOPRIM) 100 MG tablet, TAKE one-half (50 MG) tablet BY MOUTH EVERY DAY, Disp: , Rfl:     amLODIPine (NORVASC) 10 MG tablet, Take 10 mg by mouth once daily., Disp: , Rfl:     atorvastatin (LIPITOR) 20 MG tablet, Take 20 mg by mouth once daily., Disp: , Rfl:     EDURANT 25 mg Tab, Take 1 tablet (25 mg total) by mouth once daily., Disp: 30 tablet, Rfl: 3    ergocalciferol (ERGOCALCIFEROL) 50,000 unit Cap, TAKE ONE CAPSULE BY MOUTH every other week, Disp: , Rfl:     metoprolol succinate (TOPROL-XL) 25 MG 24 hr tablet, Take 25 mg by mouth once daily., Disp: , Rfl:     PREZCOBIX 800-150 mg-mg Tab tablet, Take 1 tablet by mouth once daily., Disp: 30 tablet, Rfl: 3    sildenafil (REVATIO) 20 mg Tab, Take 1 tablet (20 mg total) by mouth daily as needed (rpn secual activity)., Disp: 10 tablet, Rfl: 2    TIVICAY 50 mg Tab, Take 1 tablet (50 mg total) by mouth once daily., Disp: 30 tablet, Rfl: 3     Laboratory Data:   Recent Results (from the past 504 hour(s))   Cytology- FNA Radiology Guided, Bronch/EBUS, EUS/GI    Collection Time: 07/07/22  1:43 PM   Result Value Ref Range    Case Report       [facility]                                    Case: [id number]                                 Authorizing Provider:  JUAN CARLOS Alvarez           Collected:           07/07/2022 01:43 PM          Ordering Location:     [facility] -       Received:            07/07/2022 01:46 PM                                 Interventional Radiology                                                     Pathologist:           Patience Aguilar MD                                                         Specimens:   1) - Parotid, RT sided                                                                               2) - Parotid, aspirated fluid                                                              Final Diagnosis         1. Right parotid, fine-needle aspiration:  - Cytomorphology of acute suppurative inflammation and necrotic debris  - A few atypical cells are present which are indeterminate for malignancy.    2. Parotid aspirated fluid:  - Cytomorphology of atypical squamous cells with associated lymphocytes, in a background of acute suppurative inflammation.  - See comment.      Comment         The cytologic findings may be seen in a lymphoepithelial cyst with reactive atypia due to acute inflammation; however, carcinoma cannot be ruled out on this sample.    Correlate with clinical and radiologic findings.    - The PAS-F stain is negative for fungal organisms. Appropriately stained controls reviewed.        Clinical Information       Pt with a RT parotid gland mass      Gross Description       1. Parotid, RT sided:   Received in 95% alcohol are 2 slides for pap stain, 3 air-dried slides for Diff-Quick stain, and cytology fixative specimen submitted for cytospin preparation.     2. Parotid, aspirated fluid:   Received fresh is 5 ml of cloudy red fluid submitted for cytospin  and cell block preparation.          SPECIMEN     Quantiferon Gold TB    Collection Time: 07/12/22  2:18 PM   Result Value Ref Range    QuantiFERON-Tb Gold Plus Result Negative Negative    TB1 Ag minus Nil Result -0.16 IU/mL    TB2 Ag minus Nil Result -0.13 IU/mL    Mitogen minus Nil Result 9.78 IU/mL    Nil Result 0.22 IU/mL   Comprehensive Metabolic Panel    Collection Time: 07/12/22  2:18 PM   Result Value Ref Range    Sodium Level 137 136 - 145 mmol/L    Potassium Level 5.0 3.5 - 5.1 mmol/L    Chloride 104 98 - 107 mmol/L    Carbon Dioxide 21 (L) 22 - 29 mmol/L    Glucose Level 91 74 - 100 mg/dL    Blood Urea Nitrogen 62.6 (H) 8.4 - 25.7 mg/dL    Creatinine 5.10 (H) 0.73 - 1.18 mg/dL     Calcium Level Total 10.2 8.4 - 10.2 mg/dL    Protein Total 8.5 (H) 6.4 - 8.3 gm/dL    Albumin Level 4.5 3.5 - 5.0 gm/dL    Globulin 4.0 (H) 2.4 - 3.5 gm/dL    Albumin/Globulin Ratio 1.1 1.1 - 2.0 ratio    Bilirubin Total 0.4 <=1.5 mg/dL    Alkaline Phosphatase 92 40 - 150 unit/L    Alanine Aminotransferase 18 0 - 55 unit/L    Aspartate Aminotransferase 13 5 - 34 unit/L    Estimated GFR- 15 mls/min/1.73/m2   Chlamydia/GC, PCR    Collection Time: 07/12/22  2:18 PM    Specimen: Urine   Result Value Ref Range    Chlamydia trachomatis PCR Not Detected Not Detected    N. gonorrhea PCR Not Detected Not Detected   Lipid Panel    Collection Time: 07/12/22  2:18 PM   Result Value Ref Range    Cholesterol Total 132 <=200 mg/dL    HDL Cholesterol 39 35 - 60 mg/dL    Triglyceride 94 34 - 140 mg/dL    Cholesterol/HDL Ratio 3 0 - 5    Very Low Density Lipoprotein 19     LDL Cholesterol 74.00 50.00 - 140.00 mg/dL   SYPHILIS ANTIBODY (WITH REFLEX RPR)    Collection Time: 07/12/22  2:18 PM   Result Value Ref Range    Syphilis Antibody Reactive (A) Nonreactive, Equivocal   TSH    Collection Time: 07/12/22  2:18 PM   Result Value Ref Range    Thyroid Stimulating Hormone 1.3859 0.3500 - 4.9400 uIU/mL   Vitamin D    Collection Time: 07/12/22  2:18 PM   Result Value Ref Range    Vit D 25 OH 34.0 30.0 - 80.0 ng/mL   Urinalysis    Collection Time: 07/12/22  2:18 PM   Result Value Ref Range    Color, UA Light-Yellow (A) Yellow, Colorless, Other, Clear    Appearance, UA Clear Clear    Specific Gravity, UA 1.013     pH, UA 5.5 5.0, 5.5, 6.0, 6.5, 7.0, 7.5, 8.0, 8.5    Protein, UA 1+ (A) Negative, 300  mg/dL    Glucose, UA Normal Negative, Normal mg/dL    Ketones, UA Negative Negative, +1, +2, +3, +4, +5, >=160, >=80 mg/dL    Blood, UA 1+ (A) Negative unit/L    Bilirubin, UA Negative Negative mg/dL    Urobilinogen, UA Normal 0.2, 1.0, Normal mg/dL    Nitrites, UA Negative Negative    Leukocyte Esterase, UA 25  (A) Negative, 75   unit/L    WBC, UA 11-20 (A) None Seen, 0-2, 3-5, 0-5 /HPF    Bacteria, UA Trace (A) None Seen /HPF    Squamous Epithelial Cells, UA Occ (A) None Seen /HPF    Mucous, UA Trace (A) None Seen /LPF    Sperm, UA Trace (A) None Seen /HPF    Hyaline Casts, UA None Seen None Seen /lpf    RBC, UA 0-5 None Seen, 0-2, 3-5, 0-5 /HPF   HIV-1 RNA, Quantitative, PCR with Reflex to Genotype    Collection Time: 07/12/22  2:18 PM   Result Value Ref Range    HIV-1 RNA Detect/Quant, P Undetected Undetected copies/mL   Urine culture    Collection Time: 07/12/22  2:18 PM    Specimen: Urine   Result Value Ref Range    Urine Culture No Growth    CD4 Lymphocytes    Collection Time: 07/12/22  2:18 PM   Result Value Ref Range    Patient Age 57     WBC Absolute 8,110 4,500 - 11,500 /mm3    Lymph Percent 24.7 (L) 28 - 48 %    Lymph Absolute 2,003.17 1,260 - 5,520 x10(3)/mcL    CD4 Pct 12.4 %    CD4 Absolute 248.12633 (L) 589 - 1,505 unit/L    T Cell Interp       Normal total lymphocyte absolute count and decreased percentage.  Decreased CD4+ T helper cell absolute count and normal percentage.    Comfort Borrero MD     RPR    Collection Time: 07/12/22  2:18 PM   Result Value Ref Range    RPR Non-Reactive Non-Reactive    RPR Titer      RPR #     Syphilis Ab, TP-PA    Collection Time: 07/12/22  2:18 PM   Result Value Ref Range    Treponema pallidum Ab by TP-PA Non Reactive Non Reactive   Hepatitis C Virus Quantitative    Collection Time: 07/12/22  2:18 PM   Result Value Ref Range    HCV RNA Detect/Quant Undetected Undetected IU/mL       Imaging:  MRI Abdomen W W/O Contrast 04/21/2015  FINDINGS: Liver signal is unremarkable. There is mild surface  nodularity of the liver, likely representing cirrhotic change in this  patient with a known history of hepatitis C. No intrahepatic biliary  dilatation is seen. There are no foci of diffusion hyperintensity in  the liver. No enhancing masses are appreciated within the liver. The  portal vein is  patent.  Pancreas, spleen, and adrenal glands are unremarkable. Renal perfusion  is temporally symmetric. There are numerous tiny right renal cyst.  There is no hydronephrosis. There are prominent peripancreatic and  hepatic hilar lymph nodes which are likely related to the patient's  viral hepatitis. For example, there is a 2.3 x 1.9 cm hepatic hilar  lymph node on image 62 series 7. The abdominal aorta is of normal  caliber. There is no ascites. Lung bases are clear. Bone marrow signal  is within normal limits. The bowel is grossly unremarkable.  IMPRESSION:  Morphologic changes of cirrhosis without stigmata of portal  hypertension  No evidence for hepatocellular carcinoma.  Nonspecific hepatic hilar lymphadenopathy is likely related to the  patient's viral hepatitis.    Impression and Plan     CKD stage G5/A3, GFR <15 and albumin creatinine ratio >300 mg/g  -     Ambulatory referral/consult to Nephrology  -     Comprehensive Metabolic Panel; Future; Expected date: 08/28/2022  -     CBC Auto Differential; Future; Expected date: 08/28/2022  Previous evaluation in 2020 revealed SPEP negative for M spike, negative JAZIEL, hemoglobin A1c of 6.1.  Hepatitis-C has been treated in the past; patient is compliant with anti-retroviral therapy, HIV viral load is undetectable.  Imaging of the kidneys was unremarkable.  Unfortunately patient has a history of advanced CKD and has now progressed to stage 5 CKD.  There are no overt symptoms of uremia or acid-base/electrolyte abnormalities that would necessitate initiation renal replacement therapy on an emergent/urgent basis.  I discussed patient's disease process, prognosis, and treatment options with him today.  He was understandably upset, but is open to learning more about treatment options for ESRD.  He is not quite ready for dialysis access placement.  I offered case management support to him (to allow him to find more stable housing and other community resources), however,  patient has refused for now.  Will refer patient to Ms. Blount for pre-ESRD education.  Continue close monitoring, return to clinic with repeat labs in 1 month.  ED precautions discussed.     Primary hypertension  Blood pressure is at goal, continue current antihypertensive regimen 2 g a day dietary sodium restriction.    Anemia of chronic disease  No indications for SUZIE at present.  Will monitor.    Metabolic acidosis  Will repeat CMP in, if CO2 remains less than 22, will start bicarbonate supplementation.    Tobacco abuse  Patient was counseled on importance of tobacco use cessation.  Strongly encouraged to quit, offered a referral to a smoking cessation program.

## 2022-07-28 NOTE — PROGRESS NOTES
[facility]      Kidney care: 365 education program             Required referral information  Patient's name: Ralf Christianson  Patient's : 1965   Patient's home address:  Contact telephone number: [phone]  Cell/alternate number:  Referring physician/nurse practitioner: IVONNE Marrufo NP,   [facility]  Has dialysis treatment been initiated?  Yes_      No_X  Estimated GFR: _15  Date of next appointment:_1 mo    Please attach with referral form:  Facesheet  Most recent office note       Please direct any questions to:     Marcelle Lizama RN BSN        Kidney Care Advocate             [facility]     Office: [phone]       E fax: [phone]                 Cell phone: [phone]     03-May-2022 13:39 03-May-2022 13:00

## 2022-07-28 NOTE — TELEPHONE ENCOUNTER
----- Message from Ene Tang sent at 7/28/2022  9:31 AM CDT -----  Regarding: [facility] ID: Haydee CASTILLO PT - FOR INDU       Pt came into the clinic today to hear about his options for assistance. He stated that Ms. Geovanna told him that our  could help him.   Pt # [phone]

## 2022-07-28 NOTE — TELEPHONE ENCOUNTER
Called and spoke with patient. He stated he is needing assistance with housing and transportation. Informed him a referral can be sent to [facility] or [facility]. He requested to call [facility]. Number to [facility] given to patient. Also the number to [facility] transportation given to patient. Encouraged patient to call the clinic if he decides he wants a referral sent to [facility] or [facility]. Patient voiced understanding.

## 2022-08-04 ENCOUNTER — OFFICE VISIT (OUTPATIENT)
Dept: OTOLARYNGOLOGY | Facility: CLINIC | Age: 57
End: 2022-08-04
Payer: MEDICAID

## 2022-08-04 VITALS
HEART RATE: 62 BPM | HEIGHT: 72 IN | WEIGHT: 201 LBS | SYSTOLIC BLOOD PRESSURE: 134 MMHG | TEMPERATURE: 99 F | RESPIRATION RATE: 16 BRPM | BODY MASS INDEX: 27.22 KG/M2 | DIASTOLIC BLOOD PRESSURE: 72 MMHG

## 2022-08-04 DIAGNOSIS — K11.8 PAROTID MASS: Primary | ICD-10-CM

## 2022-08-04 PROCEDURE — 99214 OFFICE O/P EST MOD 30 MIN: CPT | Mod: PBBFAC

## 2022-08-04 NOTE — PROGRESS NOTES
[facility], formerly [facility] ENT Visit Note      Subjective:      Ralf Christianson is a 57 y.o. male who presents for f/u of R sided parotid mass. Pt reports mass chronically there, anywhere from 5-10 years.  Pt had FNA of mass in 5/22, which was benign. Approx 3 weeks afterward, he had swelling of R neck associated w/ pain/tenderness and so repeat MRI and core bx completed in 7/2022. Bx w/ atypical squamous cells w/ acute inflammation, but couldn't r/o malignancy. US in 11/2021 w/ 3 cm x 2.1 cm x 2.8 cm mass, CT Neck w/ 5.5 cm mass in 6/2022, MRI neck/soft tissue in 6/2022 w/ 5.5 x 4.9 x 4.4 cm and MRI Orbit Face Neck in 7/2022 w/ 3.5 x 2.8 x 3.7 cm complex cystic and solid mass.     Denies palpable or noticable increase in size of mass. He reports no further swelling or pain. Denies overlying skin changes or drainage from site.  Denies dysphagia, dysphonia, facial sensory or movement issues, change/loss of smell or taste. Denies fever/chills, night-sweats, weight loss. He is resistant to having mass removed at this time, worried about potential facial never palsy. Pt is HIV + (VL undetectable, CD4 248 in 7/2022) and has hx of Tobacco Use, approx 5 cigs x 35 years. Reports recent cessation.     Review of Systems: As noted in HPI     Past Medical History:   Diagnosis Date    Human immunodeficiency virus (HIV) disease     Hypertension     Renal disorder        Past Surgical History:   Procedure Laterality Date    BIOPSY OF TISSUE OF NECK Right        Family History   Problem Relation Age of Onset    Cancer Mother     No Known Problems Father     No Known Problems Brother     No Known Problems Brother     No Known Problems Brother     No Known Problems Brother        Social History     Socioeconomic History    Marital status: Single   Tobacco Use    Smoking status: Current Every Day Smoker     Packs/day: 0.50     Years: 40.00     Pack years: 20.00     Types: Cigarettes    Smokeless tobacco: Never Used   Substance and Sexual Activity     Alcohol use: Not Currently    Drug use: Never    Sexual activity: Yes     Partners: Female     Birth control/protection: Condom       Current Outpatient Medications   Medication Sig Dispense Refill    allopurinoL (ZYLOPRIM) 100 MG tablet TAKE one-half (50 MG) tablet BY MOUTH EVERY DAY      amLODIPine (NORVASC) 10 MG tablet Take 10 mg by mouth once daily.      atorvastatin (LIPITOR) 20 MG tablet Take 20 mg by mouth once daily.      EDURANT 25 mg Tab Take 1 tablet (25 mg total) by mouth once daily. 30 tablet 3    ergocalciferol (ERGOCALCIFEROL) 50,000 unit Cap TAKE ONE CAPSULE BY MOUTH every other week      metoprolol succinate (TOPROL-XL) 25 MG 24 hr tablet Take 25 mg by mouth once daily.      PREZCOBIX 800-150 mg-mg Tab tablet Take 1 tablet by mouth once daily. 30 tablet 3    sildenafil (REVATIO) 20 mg Tab Take 1 tablet (20 mg total) by mouth daily as needed (rpn secual activity). 10 tablet 2    TIVICAY 50 mg Tab Take 1 tablet (50 mg total) by mouth once daily. 30 tablet 3     No current facility-administered medications for this visit.       Review of patient's allergies indicates:  No Known Allergies    Objective:   /72 (BP Location: Right arm, Patient Position: Sitting, BP Method: Medium (Automatic))   Pulse 62   Temp 98.7 °F (37.1 °C) (Oral)   Resp 16   Ht 6' (1.829 m)   Wt 91.2 kg (201 lb)   BMI 27.26 kg/m²      Physical Exam   HENT:   Head: Normocephalic and atraumatic.   Nose: Nose normal.   Mouth/Throat: Mucous membranes are dry.   Neck:   Approx 4 cm, mobile, firm mass at tail of parotid gland. No overlying skin changes or warmth noted.    Abdominal: Normal appearance.   Musculoskeletal:      Cervical back: Normal range of motion and neck supple. No tenderness.   Lymphadenopathy:     He has no cervical adenopathy.   Neurological: He is alert. He displays no weakness. No cranial nerve deficit or sensory deficit.   Skin: Skin is warm and dry. Capillary refill takes less than 2  seconds.      Imaging:     MRI Face/Oribit/Neck (7/13/22):     FINDINGS:  There is a persistent well-marginated and complex mass immediately caudal to the right parotid gland.  The lesion overlies the sternocleidomastoid muscle which does not appear invaded and the platysma muscle bulge is around the superficial aspect of the mass.  The anterior aspect of the mass abuts the inferior right parotid gland and more caudally abuts the posterior aspect of the right submandibular gland.  It is difficult to determine if this lesion is completely separate from the right parotid gland or if the lesion is exophytic from the posteroinferior right parotid gland.  This lesion is predominantly cystic with a fairly thick peripheral soft tissue component which enhances.  The mass measures approximately 3.5 x 2.8 x 3.7 cm in greatest AP, TV, and CC dimensions on T2 weighted imaging.  The peripheral solid component of the mass is isointense to the normal parotid gland parenchyma with the predominant central cystic component of the mass mildly multiloculated.  The right parotid  gland is normal.  The left parotid gland is normal.  There is no additional solid or cystic neck mass and no cervical lymphadenopathy.     The pharynx and parapharyngeal soft tissues are normal.  There is no abnormality at the larynx.  The bilateral submandibular glands and thyroid gland are normal.  There is no prevertebral or paravertebral soft tissue abnormality.  No pathology is identified along the carotid spaces.     The visualized caudal brain is normal.  No skull base pathology is identified.  There is moderate focal mucosal thickening at the posterior most left ethmoid sinus and additional mild mucosal thickening and small mucous retention cyst formation at the alveolar recess of the left maxillary sinus.  The mastoid air cell regions are normal.  There is no acute skeletal abnormality or osseous lesion.  There is advanced cervical degenerative disc  disease at C4-C5 and C5-C6 with severe spinal canal stenosis at both levels.  There is mild bilateral neural foramen stenosis at C3-C4, moderate to severe left greater than right neural foramen stenosis at C4-C5, and severe right neural foramen stenosis at C5-C6.     Impression:     Complex cystic and solid mass centered immediately inferior to the right parotid gland at the superficial right neck and measuring 3.7 cm in greatest dimension.  Given the location, longstanding chronicity, and predominantly cystic nature of the lesion the primary differential consideration is a 2nd branchial cleft cyst.  A cystic exophytic primary parotid gland neoplasm such is a Warthin tumor is an additional consideration.  No additional neck mass or lymphadenopathy.     Multilevel cervical spine degenerative disease including severe spinal canal stenosis at C4-C5 and C5-C6.    Assessment:   57 y.o. with      Imaging  1. Parotid mass         Plan:      -Recommend that pt have open bx of mass given indeterminate imaging, pathology w/ some concern for malignancy and unilateral. Pt would like to op for serial monitoring instead after discussion of risks of malignancy and progression. Will repeat MRI in 6 months and follow up afterwards, or sooner PRN. Strict RTC precautions given.     The patient's diagnosis and medications were discussed.    Follow up in about 6 months (around 2/4/2023) for R parotid gland f/u, come back sooner as needed.      Mary Rodriguez MD    [facility] Family Medicine PGY-3  08/04/2022

## 2022-08-11 DIAGNOSIS — N18.9 CHRONIC KIDNEY DISEASE, UNSPECIFIED CKD STAGE: Primary | ICD-10-CM

## 2022-08-11 NOTE — PROGRESS NOTES
I have reviewed the notes, assessments, and/or procedures performed this visit, and I concur with the documentation.    Charlie Valdivia M.D.

## 2022-11-30 RX ORDER — METOPROLOL SUCCINATE 25 MG/1
25 TABLET, EXTENDED RELEASE ORAL DAILY
Qty: 30 TABLET | Refills: 1 | Status: SHIPPED | OUTPATIENT
Start: 2022-11-30 | End: 2023-06-26 | Stop reason: SDUPTHER

## 2022-11-30 NOTE — TELEPHONE ENCOUNTER
Refill request for Metoprolol 25mg.    LV-7/12/2022-supposed to RTC 1 month    No future appt. Left message for patient to call back to schedule f/u.

## 2023-01-24 DIAGNOSIS — K11.8 PAROTID MASS: Primary | ICD-10-CM

## 2023-05-10 ENCOUNTER — TELEPHONE (OUTPATIENT)
Dept: INFECTIOUS DISEASES | Facility: CLINIC | Age: 58
End: 2023-05-10
Payer: MEDICAID

## 2023-05-10 NOTE — TELEPHONE ENCOUNTER
Attempted to contact patient to schedule follow up appointment with Geovanna Self NP. No answer. Voicemail left. Unable to reach letter mailed to patient.     07/12/2022

## 2023-05-12 ENCOUNTER — TELEPHONE (OUTPATIENT)
Dept: INFECTIOUS DISEASES | Facility: CLINIC | Age: 58
End: 2023-05-12
Payer: MEDICAID

## 2023-05-12 NOTE — TELEPHONE ENCOUNTER
----- Message from Ene Tang sent at 5/12/2023  2:27 PM CDT -----  Regarding: Ranulfo CASTILLO PT         Pt is requesting a refill on all medications. Pt stated that he would like to speak with Geovanna regarding missed appts    [facility] in [place]   Please call back @  [phone]

## 2023-06-26 ENCOUNTER — OFFICE VISIT (OUTPATIENT)
Dept: INFECTIOUS DISEASES | Facility: CLINIC | Age: 58
End: 2023-06-26
Payer: MEDICAID

## 2023-06-26 ENCOUNTER — DOCUMENTATION ONLY (OUTPATIENT)
Dept: INFECTIOUS DISEASES | Facility: CLINIC | Age: 58
End: 2023-06-26
Payer: MEDICAID

## 2023-06-26 VITALS
DIASTOLIC BLOOD PRESSURE: 74 MMHG | SYSTOLIC BLOOD PRESSURE: 148 MMHG | HEART RATE: 73 BPM | BODY MASS INDEX: 28.56 KG/M2 | HEIGHT: 72 IN | RESPIRATION RATE: 18 BRPM | TEMPERATURE: 98 F | WEIGHT: 210.88 LBS

## 2023-06-26 DIAGNOSIS — E55.9 VITAMIN D DEFICIENCY, UNSPECIFIED: ICD-10-CM

## 2023-06-26 DIAGNOSIS — N18.9 CHRONIC RENAL FAILURE, UNSPECIFIED CKD STAGE: ICD-10-CM

## 2023-06-26 DIAGNOSIS — N52.9 ERECTILE DYSFUNCTION, UNSPECIFIED ERECTILE DYSFUNCTION TYPE: ICD-10-CM

## 2023-06-26 DIAGNOSIS — E87.5 HYPERKALEMIA: Primary | ICD-10-CM

## 2023-06-26 DIAGNOSIS — Z59.41 FOOD INSECURITY: ICD-10-CM

## 2023-06-26 DIAGNOSIS — I10 ESSENTIAL (PRIMARY) HYPERTENSION: ICD-10-CM

## 2023-06-26 DIAGNOSIS — B20 HIV DISEASE: Primary | ICD-10-CM

## 2023-06-26 DIAGNOSIS — Z59.819 HOUSING INSECURITY: ICD-10-CM

## 2023-06-26 DIAGNOSIS — E78.5 HYPERLIPIDEMIA, UNSPECIFIED HYPERLIPIDEMIA TYPE: ICD-10-CM

## 2023-06-26 LAB
ALBUMIN SERPL-MCNC: 4.1 G/DL (ref 3.5–5)
ALBUMIN/GLOB SERPL: 1.2 RATIO (ref 1.1–2)
ALP SERPL-CCNC: 96 UNIT/L (ref 40–150)
ALT SERPL-CCNC: 12 UNIT/L (ref 0–55)
APPEARANCE UR: CLEAR
AST SERPL-CCNC: 13 UNIT/L (ref 5–34)
BACTERIA #/AREA URNS AUTO: ABNORMAL /HPF
BASOPHILS # BLD AUTO: 0.03 X10(3)/MCL
BASOPHILS NFR BLD AUTO: 0.3 %
BILIRUB UR QL STRIP.AUTO: NEGATIVE MG/DL
BILIRUBIN DIRECT+TOT PNL SERPL-MCNC: 0.2 MG/DL
BUN SERPL-MCNC: 73.3 MG/DL (ref 8.4–25.7)
C TRACH DNA SPEC QL NAA+PROBE: NOT DETECTED
CALCIUM SERPL-MCNC: 9.3 MG/DL (ref 8.4–10.2)
CHLORIDE SERPL-SCNC: 114 MMOL/L (ref 98–107)
CHOLEST SERPL-MCNC: 109 MG/DL
CHOLEST/HDLC SERPL: 3 {RATIO} (ref 0–5)
CO2 SERPL-SCNC: 15 MMOL/L (ref 22–29)
COLOR UR: COLORLESS
CREAT SERPL-MCNC: 6.86 MG/DL (ref 0.73–1.18)
DEPRECATED CALCIDIOL+CALCIFEROL SERPL-MC: 23.9 NG/ML (ref 30–80)
EOSINOPHIL # BLD AUTO: 0.19 X10(3)/MCL (ref 0–0.9)
EOSINOPHIL NFR BLD AUTO: 2.2 %
ERYTHROCYTE [DISTWIDTH] IN BLOOD BY AUTOMATED COUNT: 16.6 % (ref 11.5–17)
EST. AVERAGE GLUCOSE BLD GHB EST-MCNC: 114 MG/DL
GFR SERPLBLD CREATININE-BSD FMLA CKD-EPI: 9 MLS/MIN/1.73/M2
GLOBULIN SER-MCNC: 3.3 GM/DL (ref 2.4–3.5)
GLUCOSE SERPL-MCNC: 106 MG/DL (ref 74–100)
GLUCOSE UR QL STRIP.AUTO: NORMAL MG/DL
HBA1C MFR BLD: 5.6 %
HCT VFR BLD AUTO: 30 % (ref 42–52)
HDLC SERPL-MCNC: 35 MG/DL (ref 35–60)
HGB BLD-MCNC: 9.1 G/DL (ref 14–18)
HYALINE CASTS #/AREA URNS LPF: ABNORMAL /LPF
IMM GRANULOCYTES # BLD AUTO: 0.03 X10(3)/MCL (ref 0–0.04)
IMM GRANULOCYTES NFR BLD AUTO: 0.3 %
KETONES UR QL STRIP.AUTO: NEGATIVE MG/DL
LDLC SERPL CALC-MCNC: 59 MG/DL (ref 50–140)
LEUKOCYTE ESTERASE UR QL STRIP.AUTO: 250 UNIT/L
LYMPHOCYTES # BLD AUTO: 2.05 X10(3)/MCL (ref 0.6–4.6)
LYMPHOCYTES NFR BLD AUTO: 23.7 %
MCH RBC QN AUTO: 22.3 PG (ref 27–31)
MCHC RBC AUTO-ENTMCNC: 30.3 G/DL (ref 33–36)
MCV RBC AUTO: 73.5 FL (ref 80–94)
MONOCYTES # BLD AUTO: 0.66 X10(3)/MCL (ref 0.1–1.3)
MONOCYTES NFR BLD AUTO: 7.6 %
N GONORRHOEA DNA SPEC QL NAA+PROBE: NOT DETECTED
NEUTROPHILS # BLD AUTO: 5.68 X10(3)/MCL (ref 2.1–9.2)
NEUTROPHILS NFR BLD AUTO: 65.9 %
NITRITE UR QL STRIP.AUTO: NEGATIVE
NRBC BLD AUTO-RTO: 0 %
PH UR STRIP.AUTO: 5.5 [PH]
PLATELET # BLD AUTO: 247 X10(3)/MCL (ref 130–400)
PMV BLD AUTO: 10.1 FL (ref 7.4–10.4)
POTASSIUM SERPL-SCNC: 5.3 MMOL/L (ref 3.5–5.1)
PROT SERPL-MCNC: 7.4 GM/DL (ref 6.4–8.3)
PROT UR QL STRIP.AUTO: ABNORMAL MG/DL
RBC # BLD AUTO: 4.08 X10(6)/MCL (ref 4.7–6.1)
RBC #/AREA URNS AUTO: ABNORMAL /HPF
RBC UR QL AUTO: ABNORMAL UNIT/L
SODIUM SERPL-SCNC: 139 MMOL/L (ref 136–145)
SP GR UR STRIP.AUTO: 1.01
SQUAMOUS #/AREA URNS LPF: ABNORMAL /HPF
T PALLIDUM AB SER QL: REACTIVE
TRIGL SERPL-MCNC: 76 MG/DL (ref 34–140)
TSH SERPL-ACNC: 1.2 UIU/ML (ref 0.35–4.94)
UROBILINOGEN UR STRIP-ACNC: NORMAL MG/DL
VLDLC SERPL CALC-MCNC: 15 MG/DL
WBC # SPEC AUTO: 8.64 X10(3)/MCL (ref 4.5–11.5)
WBC #/AREA URNS AUTO: ABNORMAL /HPF

## 2023-06-26 PROCEDURE — 99215 PR OFFICE/OUTPT VISIT, EST, LEVL V, 40-54 MIN: ICD-10-PCS | Mod: S$PBB,,, | Performed by: NURSE PRACTITIONER

## 2023-06-26 PROCEDURE — 1160F RVW MEDS BY RX/DR IN RCRD: CPT | Mod: CPTII,,, | Performed by: NURSE PRACTITIONER

## 2023-06-26 PROCEDURE — 87591 N.GONORRHOEAE DNA AMP PROB: CPT | Performed by: NURSE PRACTITIONER

## 2023-06-26 PROCEDURE — 3008F BODY MASS INDEX DOCD: CPT | Mod: CPTII,,, | Performed by: NURSE PRACTITIONER

## 2023-06-26 PROCEDURE — 80053 COMPREHEN METABOLIC PANEL: CPT | Performed by: NURSE PRACTITIONER

## 2023-06-26 PROCEDURE — 85025 COMPLETE CBC W/AUTO DIFF WBC: CPT | Performed by: NURSE PRACTITIONER

## 2023-06-26 PROCEDURE — 1159F PR MEDICATION LIST DOCUMENTED IN MEDICAL RECORD: ICD-10-PCS | Mod: CPTII,,, | Performed by: NURSE PRACTITIONER

## 2023-06-26 PROCEDURE — 3008F PR BODY MASS INDEX (BMI) DOCUMENTED: ICD-10-PCS | Mod: CPTII,,, | Performed by: NURSE PRACTITIONER

## 2023-06-26 PROCEDURE — 83036 HEMOGLOBIN GLYCOSYLATED A1C: CPT | Performed by: NURSE PRACTITIONER

## 2023-06-26 PROCEDURE — 99214 OFFICE O/P EST MOD 30 MIN: CPT | Mod: PBBFAC | Performed by: NURSE PRACTITIONER

## 2023-06-26 PROCEDURE — 86361 T CELL ABSOLUTE COUNT: CPT | Performed by: NURSE PRACTITIONER

## 2023-06-26 PROCEDURE — 3077F PR MOST RECENT SYSTOLIC BLOOD PRESSURE >= 140 MM HG: ICD-10-PCS | Mod: CPTII,,, | Performed by: NURSE PRACTITIONER

## 2023-06-26 PROCEDURE — 86592 SYPHILIS TEST NON-TREP QUAL: CPT | Performed by: NURSE PRACTITIONER

## 2023-06-26 PROCEDURE — 3078F DIAST BP <80 MM HG: CPT | Mod: CPTII,,, | Performed by: NURSE PRACTITIONER

## 2023-06-26 PROCEDURE — 87184 SC STD DISK METHOD PER PLATE: CPT | Performed by: NURSE PRACTITIONER

## 2023-06-26 PROCEDURE — 87536 HIV-1 QUANT&REVRSE TRNSCRPJ: CPT | Performed by: NURSE PRACTITIONER

## 2023-06-26 PROCEDURE — 84443 ASSAY THYROID STIM HORMONE: CPT | Performed by: NURSE PRACTITIONER

## 2023-06-26 PROCEDURE — 82306 VITAMIN D 25 HYDROXY: CPT | Performed by: NURSE PRACTITIONER

## 2023-06-26 PROCEDURE — 1160F PR REVIEW ALL MEDS BY PRESCRIBER/CLIN PHARMACIST DOCUMENTED: ICD-10-PCS | Mod: CPTII,,, | Performed by: NURSE PRACTITIONER

## 2023-06-26 PROCEDURE — 1159F MED LIST DOCD IN RCRD: CPT | Mod: CPTII,,, | Performed by: NURSE PRACTITIONER

## 2023-06-26 PROCEDURE — 36415 COLL VENOUS BLD VENIPUNCTURE: CPT | Performed by: NURSE PRACTITIONER

## 2023-06-26 PROCEDURE — 99215 OFFICE O/P EST HI 40 MIN: CPT | Mod: S$PBB,,, | Performed by: NURSE PRACTITIONER

## 2023-06-26 PROCEDURE — 86780 TREPONEMA PALLIDUM: CPT | Performed by: NURSE PRACTITIONER

## 2023-06-26 PROCEDURE — 3078F PR MOST RECENT DIASTOLIC BLOOD PRESSURE < 80 MM HG: ICD-10-PCS | Mod: CPTII,,, | Performed by: NURSE PRACTITIONER

## 2023-06-26 PROCEDURE — 86480 TB TEST CELL IMMUN MEASURE: CPT | Performed by: NURSE PRACTITIONER

## 2023-06-26 PROCEDURE — 81001 URINALYSIS AUTO W/SCOPE: CPT | Performed by: NURSE PRACTITIONER

## 2023-06-26 PROCEDURE — 80061 LIPID PANEL: CPT | Performed by: NURSE PRACTITIONER

## 2023-06-26 PROCEDURE — 3077F SYST BP >= 140 MM HG: CPT | Mod: CPTII,,, | Performed by: NURSE PRACTITIONER

## 2023-06-26 RX ORDER — ERGOCALCIFEROL 1.25 MG/1
50000 CAPSULE ORAL
Qty: 12 CAPSULE | Refills: 1 | Status: SHIPPED | OUTPATIENT
Start: 2023-06-26 | End: 2023-11-17

## 2023-06-26 RX ORDER — DARUNAVIR ETHANOLATE AND COBICISTAT 800; 150 MG/1; MG/1
1 TABLET, FILM COATED ORAL DAILY
Qty: 90 TABLET | Refills: 1 | Status: SHIPPED | OUTPATIENT
Start: 2023-06-26 | End: 2023-12-14

## 2023-06-26 RX ORDER — ATORVASTATIN CALCIUM 20 MG/1
20 TABLET, FILM COATED ORAL DAILY
Qty: 90 TABLET | Refills: 1 | Status: SHIPPED | OUTPATIENT
Start: 2023-06-26 | End: 2023-12-14

## 2023-06-26 RX ORDER — METOPROLOL SUCCINATE 25 MG/1
25 TABLET, EXTENDED RELEASE ORAL DAILY
Qty: 90 TABLET | Refills: 1 | Status: SHIPPED | OUTPATIENT
Start: 2023-06-26 | End: 2023-12-14

## 2023-06-26 RX ORDER — AMLODIPINE BESYLATE 10 MG/1
10 TABLET ORAL DAILY
Qty: 90 TABLET | Refills: 1 | Status: SHIPPED | OUTPATIENT
Start: 2023-06-26 | End: 2023-12-14

## 2023-06-26 RX ORDER — RILPIVIRINE HYDROCHLORIDE 25 MG/1
1 TABLET, FILM COATED ORAL DAILY
Qty: 90 TABLET | Refills: 1 | Status: SHIPPED | OUTPATIENT
Start: 2023-06-26 | End: 2023-12-14

## 2023-06-26 RX ORDER — SILDENAFIL CITRATE 20 MG/1
20 TABLET ORAL DAILY PRN
Qty: 10 TABLET | Refills: 2 | Status: SHIPPED | OUTPATIENT
Start: 2023-06-26

## 2023-06-26 RX ORDER — DOLUTEGRAVIR SODIUM 50 MG/1
1 TABLET, FILM COATED ORAL DAILY
Qty: 90 TABLET | Refills: 1 | Status: SHIPPED | OUTPATIENT
Start: 2023-06-26 | End: 2023-12-14

## 2023-06-26 SDOH — SOCIAL DETERMINANTS OF HEALTH (SDOH): FOOD INSECURITY: Z59.41

## 2023-06-26 SDOH — SOCIAL DETERMINANTS OF HEALTH (SDOH): HOUSING INSTABILITY UNSPECIFIED: Z59.819

## 2023-06-26 NOTE — PROGRESS NOTES
STEPHEN Lea, 1965, came in for scheduled appointment with Geovanna Bass NP on 2023. He states he is in need of assistance with living. [facility] assistance offered. Patient gave verbal consent to send [facility] referral. Referral sent.

## 2023-06-26 NOTE — PROGRESS NOTES
Potassium level 5.3.  Pt with known CRF, refuses any further nephrology evaluation or treatment.  He denies chest pain, myalgias. Will treat with Lokelma 10 mg tid x 48 hours.  Phoned pt, voiced understanding & appreciation.
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
## 2023-06-27 ENCOUNTER — TELEPHONE (OUTPATIENT)
Dept: ADMINISTRATIVE | Facility: HOSPITAL | Age: 58
End: 2023-06-27
Payer: MEDICAID

## 2023-06-27 DIAGNOSIS — A53.0 LATENT SYPHILIS: Primary | ICD-10-CM

## 2023-06-27 LAB
AGE: 56
CD3+CD4+ CELLS # SPEC: 329.7 UNIT/L (ref 589–1505)
CD3+CD4+ CELLS NFR BLD: 15.9 %
LYMPHOCYTES # BLD AUTO: 2073.6 X10(3)/MCL (ref 1260–5520)
LYMPHOCYTES NFR LN MANUAL: 24 % (ref 28–48)
LYMPHOMA - T-CELL MARKERS SPEC-IMP: ABNORMAL
RPR SER QL: REACTIVE
RPR SER-TITR: ABNORMAL {TITER}
WBC # BLD AUTO: 8640 /MM3 (ref 4500–11500)

## 2023-06-27 NOTE — TELEPHONE ENCOUNTER
----- Message from DINA Estrada sent at 6/27/2023 11:54 AM CDT -----  RPR titer increased from NR to 8 dils.  Urine culture + for group B strept.  He needs to be treated with Bicillin 2.4 mil units IM for syphilis, which will also cover for the UTI infection.  Please call him with results.  I know that he is staying in the [facility], we can send results and orders to a local facility if he prefers due to transportation concerns.  Also let him know that our  has tried to reach out to him regarding [place] referral for housing assistance.  Thank you.

## 2023-06-27 NOTE — PROGRESS NOTES
RPR titer increased from NR to 8 dils.  Urine culture + for group B strept.  He needs to be treated with Bicillin 2.4 mil units IM for syphilis, which will also cover for the UTI infection.  Please call him with results.  I know that he is staying in the [facility], we can send results and orders to a local facility if he prefers due to transportation concerns.  Also let him know that our  has tried to reach out to him regarding [place] referral for housing assistance.  Thank you.

## 2023-06-27 NOTE — TELEPHONE ENCOUNTER
Positive reactive RPR with 8 dils. Phoned patient. No answer. Message left on voice mail to contact clinic.

## 2023-06-28 LAB
GAMMA INTERFERON BACKGROUND BLD IA-ACNC: 0.02 IU/ML
HIV1 RNA # PLAS NAA DL=20: NORMAL COPIES/ML
M TB IFN-G BLD-IMP: NEGATIVE
M TB IFN-G CD4+ BCKGRND COR BLD-ACNC: 0.01 IU/ML
M TB IFN-G CD4+CD8+ BCKGRND COR BLD-ACNC: 0.02 IU/ML
MITOGEN IGNF BCKGRD COR BLD-ACNC: >10 IU/ML

## 2023-06-29 LAB — BACTERIA UR CULT: ABNORMAL

## 2023-06-30 RX ORDER — DOXYCYCLINE HYCLATE 100 MG
100 TABLET ORAL 2 TIMES DAILY
Qty: 56 TABLET | Refills: 0 | Status: SHIPPED | OUTPATIENT
Start: 2023-06-30 | End: 2023-07-28

## 2023-06-30 RX ORDER — DOXYCYCLINE HYCLATE 100 MG
100 TABLET ORAL 2 TIMES DAILY
COMMUNITY
End: 2023-06-30 | Stop reason: SDUPTHER

## 2023-06-30 NOTE — TELEPHONE ENCOUNTER
Phoned patient. Discussed RPR form NR to 8 dils and treatment needed. Due to nationwide shortage of Bicillin injection of doxycycline 100mg orally BID per Kamilah Ayala, PRICILLA. Did notify of group B strep in urine and needs treatment as well. Requests meds to [facility]. Encouraged sexual health and wellness and to notify partners for treatment. Also discussed [place] Assistance  trying to contact with assistance. States telephone was out of order at the time and will contact for help. Voiced understanding and appreciates call.

## 2023-08-28 ENCOUNTER — PATIENT OUTREACH (OUTPATIENT)
Dept: ADMINISTRATIVE | Facility: OTHER | Age: 58
End: 2023-08-28
Payer: MEDICAID

## 2023-09-07 NOTE — PROGRESS NOTES
CHW - Outreach Attempt    Barbara Vega Community Health Worker left a voicemail message for 2nd attempt to contact patient regarding: Community Health Program   Community Health Worker to attempt to contact patient on September 8, 2023.

## 2023-09-08 ENCOUNTER — PATIENT OUTREACH (OUTPATIENT)
Dept: ADMINISTRATIVE | Facility: OTHER | Age: 58
End: 2023-09-08
Payer: MEDICAID

## 2023-09-08 NOTE — PROGRESS NOTES
CHW - Outreach Attempt    Barbara Vega, Community Health Worker left a voicemail message for 3rd attempt to contact patient regarding: Community Health     CHW - Unable to Contact    Community Health Worker to close episode at this time due to three missed attempts for patient contact.

## 2023-11-16 DIAGNOSIS — E55.9 VITAMIN D DEFICIENCY, UNSPECIFIED: ICD-10-CM

## 2023-11-17 RX ORDER — ERGOCALCIFEROL 1.25 MG/1
50000 CAPSULE ORAL
Qty: 4 CAPSULE | Refills: 0 | Status: SHIPPED | OUTPATIENT
Start: 2023-11-17

## 2024-05-01 ENCOUNTER — OFFICE VISIT (OUTPATIENT)
Dept: INFECTIOUS DISEASES | Facility: CLINIC | Age: 59
End: 2024-05-01
Payer: MEDICAID

## 2024-05-01 ENCOUNTER — TELEPHONE (OUTPATIENT)
Dept: INFECTIOUS DISEASES | Facility: CLINIC | Age: 59
End: 2024-05-01

## 2024-05-01 ENCOUNTER — LAB VISIT (OUTPATIENT)
Dept: LAB | Facility: HOSPITAL | Age: 59
End: 2024-05-01
Attending: NURSE PRACTITIONER
Payer: MEDICAID

## 2024-05-01 VITALS
DIASTOLIC BLOOD PRESSURE: 72 MMHG | RESPIRATION RATE: 14 BRPM | HEIGHT: 72 IN | BODY MASS INDEX: 28.53 KG/M2 | WEIGHT: 210.63 LBS | SYSTOLIC BLOOD PRESSURE: 130 MMHG | HEART RATE: 57 BPM | TEMPERATURE: 98 F

## 2024-05-01 DIAGNOSIS — B20 HIV DISEASE: Primary | ICD-10-CM

## 2024-05-01 DIAGNOSIS — N52.9 ERECTILE DYSFUNCTION, UNSPECIFIED ERECTILE DYSFUNCTION TYPE: ICD-10-CM

## 2024-05-01 DIAGNOSIS — Z86.19 HISTORY OF SYPHILIS: ICD-10-CM

## 2024-05-01 DIAGNOSIS — B20 HIV DISEASE: ICD-10-CM

## 2024-05-01 DIAGNOSIS — E78.5 HYPERLIPIDEMIA, UNSPECIFIED HYPERLIPIDEMIA TYPE: ICD-10-CM

## 2024-05-01 DIAGNOSIS — N18.5 CKD (CHRONIC KIDNEY DISEASE) STAGE 5, GFR LESS THAN 15 ML/MIN: ICD-10-CM

## 2024-05-01 DIAGNOSIS — I10 ESSENTIAL (PRIMARY) HYPERTENSION: ICD-10-CM

## 2024-05-01 LAB
ALBUMIN SERPL-MCNC: 4.5 G/DL (ref 3.5–5)
ALBUMIN/GLOB SERPL: 1.2 RATIO (ref 1.1–2)
ALP SERPL-CCNC: 99 UNIT/L (ref 40–150)
ALT SERPL-CCNC: 13 UNIT/L (ref 0–55)
APPEARANCE UR: CLEAR
AST SERPL-CCNC: 13 UNIT/L (ref 5–34)
BACTERIA #/AREA URNS AUTO: ABNORMAL /HPF
BASOPHILS # BLD AUTO: 0.03 X10(3)/MCL
BASOPHILS NFR BLD AUTO: 0.3 %
BILIRUB SERPL-MCNC: 0.3 MG/DL
BILIRUB UR QL STRIP.AUTO: NEGATIVE
BUN SERPL-MCNC: 107.7 MG/DL (ref 8.4–25.7)
CALCIUM SERPL-MCNC: 9.4 MG/DL (ref 8.4–10.2)
CHLORIDE SERPL-SCNC: 114 MMOL/L (ref 98–107)
CO2 SERPL-SCNC: 14 MMOL/L (ref 22–29)
COLOR UR AUTO: COLORLESS
CREAT SERPL-MCNC: 10.12 MG/DL (ref 0.73–1.18)
EOSINOPHIL # BLD AUTO: 0.41 X10(3)/MCL (ref 0–0.9)
EOSINOPHIL NFR BLD AUTO: 4.4 %
ERYTHROCYTE [DISTWIDTH] IN BLOOD BY AUTOMATED COUNT: 18.9 % (ref 11.5–17)
GFR SERPLBLD CREATININE-BSD FMLA CKD-EPI: 5 MLS/MIN/1.73/M2
GLOBULIN SER-MCNC: 3.8 GM/DL (ref 2.4–3.5)
GLUCOSE SERPL-MCNC: 103 MG/DL (ref 74–100)
GLUCOSE UR QL STRIP.AUTO: NORMAL
HCT VFR BLD AUTO: 25.7 % (ref 42–52)
HGB BLD-MCNC: 7.8 G/DL (ref 14–18)
HYALINE CASTS #/AREA URNS LPF: ABNORMAL /LPF
IMM GRANULOCYTES # BLD AUTO: 0.04 X10(3)/MCL (ref 0–0.04)
IMM GRANULOCYTES NFR BLD AUTO: 0.4 %
KETONES UR QL STRIP.AUTO: NEGATIVE
LEUKOCYTE ESTERASE UR QL STRIP.AUTO: 25
LYMPHOCYTES # BLD AUTO: 2.62 X10(3)/MCL (ref 0.6–4.6)
LYMPHOCYTES NFR BLD AUTO: 28 %
MCH RBC QN AUTO: 22.1 PG (ref 27–31)
MCHC RBC AUTO-ENTMCNC: 30.4 G/DL (ref 33–36)
MCV RBC AUTO: 72.8 FL (ref 80–94)
MONOCYTES # BLD AUTO: 0.65 X10(3)/MCL (ref 0.1–1.3)
MONOCYTES NFR BLD AUTO: 6.9 %
NEUTROPHILS # BLD AUTO: 5.62 X10(3)/MCL (ref 2.1–9.2)
NEUTROPHILS NFR BLD AUTO: 60 %
NITRITE UR QL STRIP.AUTO: NEGATIVE
NRBC BLD AUTO-RTO: 0 %
PH UR STRIP.AUTO: 5.5 [PH]
PLATELET # BLD AUTO: 182 X10(3)/MCL (ref 130–400)
PLATELETS.RETICULATED NFR BLD AUTO: 1.2 % (ref 0.9–11.2)
PMV BLD AUTO: 9.7 FL (ref 7.4–10.4)
POTASSIUM SERPL-SCNC: 7.1 MMOL/L (ref 3.5–5.1)
PROT SERPL-MCNC: 8.3 GM/DL (ref 6.4–8.3)
PROT UR QL STRIP.AUTO: ABNORMAL
RBC # BLD AUTO: 3.53 X10(6)/MCL (ref 4.7–6.1)
RBC #/AREA URNS AUTO: ABNORMAL /HPF
RBC UR QL AUTO: ABNORMAL
RPR SER QL: REACTIVE
RPR SER-TITR: ABNORMAL {TITER}
SODIUM SERPL-SCNC: 137 MMOL/L (ref 136–145)
SP GR UR STRIP.AUTO: 1.01 (ref 1–1.03)
SQUAMOUS #/AREA URNS LPF: ABNORMAL /HPF
T PALLIDUM AB SER QL: REACTIVE
UROBILINOGEN UR STRIP-ACNC: NORMAL
WBC # SPEC AUTO: 9.37 X10(3)/MCL (ref 4.5–11.5)
WBC #/AREA URNS AUTO: ABNORMAL /HPF

## 2024-05-01 PROCEDURE — 36415 COLL VENOUS BLD VENIPUNCTURE: CPT

## 2024-05-01 PROCEDURE — 3075F SYST BP GE 130 - 139MM HG: CPT | Mod: CPTII,,, | Performed by: NURSE PRACTITIONER

## 2024-05-01 PROCEDURE — 87536 HIV-1 QUANT&REVRSE TRNSCRPJ: CPT

## 2024-05-01 PROCEDURE — 80053 COMPREHEN METABOLIC PANEL: CPT

## 2024-05-01 PROCEDURE — 3078F DIAST BP <80 MM HG: CPT | Mod: CPTII,,, | Performed by: NURSE PRACTITIONER

## 2024-05-01 PROCEDURE — 99215 OFFICE O/P EST HI 40 MIN: CPT | Mod: S$PBB,,, | Performed by: NURSE PRACTITIONER

## 2024-05-01 PROCEDURE — 86360 T CELL ABSOLUTE COUNT/RATIO: CPT

## 2024-05-01 PROCEDURE — 85025 COMPLETE CBC W/AUTO DIFF WBC: CPT

## 2024-05-01 PROCEDURE — 86592 SYPHILIS TEST NON-TREP QUAL: CPT

## 2024-05-01 PROCEDURE — 99213 OFFICE O/P EST LOW 20 MIN: CPT | Mod: PBBFAC | Performed by: NURSE PRACTITIONER

## 2024-05-01 PROCEDURE — 1159F MED LIST DOCD IN RCRD: CPT | Mod: CPTII,,, | Performed by: NURSE PRACTITIONER

## 2024-05-01 PROCEDURE — 86780 TREPONEMA PALLIDUM: CPT

## 2024-05-01 PROCEDURE — 1160F RVW MEDS BY RX/DR IN RCRD: CPT | Mod: CPTII,,, | Performed by: NURSE PRACTITIONER

## 2024-05-01 PROCEDURE — 81015 MICROSCOPIC EXAM OF URINE: CPT | Performed by: NURSE PRACTITIONER

## 2024-05-01 PROCEDURE — 99417 PROLNG OP E/M EACH 15 MIN: CPT | Mod: S$PBB,,, | Performed by: NURSE PRACTITIONER

## 2024-05-01 RX ORDER — DARUNAVIR ETHANOLATE AND COBICISTAT 800; 150 MG/1; MG/1
1 TABLET, FILM COATED ORAL DAILY
Qty: 90 TABLET | Refills: 1 | Status: SHIPPED | OUTPATIENT
Start: 2024-05-01

## 2024-05-01 RX ORDER — SILDENAFIL CITRATE 20 MG/1
20 TABLET ORAL DAILY PRN
Qty: 20 TABLET | Refills: 2 | Status: SHIPPED | OUTPATIENT
Start: 2024-05-01

## 2024-05-01 RX ORDER — DOLUTEGRAVIR SODIUM 50 MG/1
1 TABLET, FILM COATED ORAL DAILY
Qty: 90 TABLET | Refills: 1 | Status: SHIPPED | OUTPATIENT
Start: 2024-05-01

## 2024-05-01 RX ORDER — METOPROLOL SUCCINATE 25 MG/1
25 TABLET, EXTENDED RELEASE ORAL DAILY
Qty: 90 TABLET | Refills: 3 | Status: SHIPPED | OUTPATIENT
Start: 2024-05-01

## 2024-05-01 RX ORDER — ATORVASTATIN CALCIUM 20 MG/1
20 TABLET, FILM COATED ORAL DAILY
Qty: 90 TABLET | Refills: 3 | Status: SHIPPED | OUTPATIENT
Start: 2024-05-01

## 2024-05-01 RX ORDER — AMLODIPINE BESYLATE 10 MG/1
10 TABLET ORAL DAILY
Qty: 90 TABLET | Refills: 1 | Status: SHIPPED | OUTPATIENT
Start: 2024-05-01
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
## 2024-05-01 NOTE — TELEPHONE ENCOUNTER
Stephania with lab reported critical Potassium level of 7.1.   Geovanna informed and will notify pt.

## 2024-05-02 ENCOUNTER — HOSPITAL ENCOUNTER (EMERGENCY)
Facility: HOSPITAL | Age: 59
Discharge: HOME | End: 2024-05-02
Attending: INTERNAL MEDICINE
Payer: MEDICAID

## 2024-05-02 VITALS
OXYGEN SATURATION: 100 % | SYSTOLIC BLOOD PRESSURE: 135 MMHG | BODY MASS INDEX: 28.37 KG/M2 | WEIGHT: 209.44 LBS | HEIGHT: 72 IN | HEART RATE: 70 BPM | TEMPERATURE: 98 F | RESPIRATION RATE: 18 BRPM | DIASTOLIC BLOOD PRESSURE: 73 MMHG

## 2024-05-02 DIAGNOSIS — R07.9 CHEST PAIN: ICD-10-CM

## 2024-05-02 DIAGNOSIS — E87.5 HYPERKALEMIA: Primary | ICD-10-CM

## 2024-05-02 DIAGNOSIS — N18.6 ESRD NEEDING DIALYSIS: ICD-10-CM

## 2024-05-02 DIAGNOSIS — Z99.2 ESRD NEEDING DIALYSIS: ICD-10-CM

## 2024-05-02 LAB
ALBUMIN SERPL-MCNC: 4.2 G/DL (ref 3.5–5)
ALBUMIN/GLOB SERPL: 1.1 RATIO (ref 1.1–2)
ALP SERPL-CCNC: 100 UNIT/L (ref 40–150)
ALT SERPL-CCNC: 10 UNIT/L (ref 0–55)
ANION GAP SERPL CALC-SCNC: 15 MEQ/L
AST SERPL-CCNC: 10 UNIT/L (ref 5–34)
BILIRUB SERPL-MCNC: 0.2 MG/DL
BUN SERPL-MCNC: 102.9 MG/DL (ref 8.4–25.7)
BUN SERPL-MCNC: 99.3 MG/DL (ref 8.4–25.7)
CALCIUM SERPL-MCNC: 8.8 MG/DL (ref 8.4–10.2)
CALCIUM SERPL-MCNC: 8.8 MG/DL (ref 8.4–10.2)
CD3 CELLS # BLD: 1240 CELLS/MCL (ref 550–2202)
CD3 CELLS NFR BLD: 52 % (ref 58–86)
CD3+CD4+ CELLS # BLD: 438 CELLS/MCL (ref 365–1437)
CD3+CD4+ CELLS NFR BLD: 18 % (ref 32–64)
CD3+CD4+ CELLS/CD3+CD8+ CLL BLD: 0.6 %
CD3+CD8+ CELLS # BLD: 757 CELLS/MCL (ref 117–846)
CD3+CD8+ CELLS NFR BLD: 32 % (ref 11–40)
CD45 CELLS # BLD: 2.38 THOU/MCL (ref 0.82–2.84)
CHLORIDE SERPL-SCNC: 113 MMOL/L (ref 98–107)
CHLORIDE SERPL-SCNC: 115 MMOL/L (ref 98–107)
CO2 SERPL-SCNC: 13 MMOL/L (ref 22–29)
CO2 SERPL-SCNC: 14 MMOL/L (ref 22–29)
CREAT SERPL-MCNC: 10.07 MG/DL (ref 0.73–1.18)
CREAT SERPL-MCNC: 10.59 MG/DL (ref 0.73–1.18)
CREAT/UREA NIT SERPL: 10
GFR SERPLBLD CREATININE-BSD FMLA CKD-EPI: 5 MLS/MIN/1.73/M2
GFR SERPLBLD CREATININE-BSD FMLA CKD-EPI: 5 MLS/MIN/1.73/M2
GLOBULIN SER-MCNC: 3.7 GM/DL (ref 2.4–3.5)
GLUCOSE SERPL-MCNC: 106 MG/DL (ref 74–100)
GLUCOSE SERPL-MCNC: 60 MG/DL (ref 74–100)
HOLD SPECIMEN: NORMAL
MAGNESIUM SERPL-MCNC: 2.2 MG/DL (ref 1.6–2.6)
OHS QRS DURATION: 88 MS
OHS QTC CALCULATION: 425 MS
PHOSPHATE SERPL-MCNC: 6.2 MG/DL (ref 2.3–4.7)
POTASSIUM SERPL-SCNC: 4.8 MMOL/L (ref 3.5–5.1)
POTASSIUM SERPL-SCNC: 5.8 MMOL/L (ref 3.5–5.1)
PROT SERPL-MCNC: 7.9 GM/DL (ref 6.4–8.3)
SODIUM SERPL-SCNC: 139 MMOL/L (ref 136–145)
SODIUM SERPL-SCNC: 142 MMOL/L (ref 136–145)

## 2024-05-02 PROCEDURE — 83735 ASSAY OF MAGNESIUM: CPT | Performed by: INTERNAL MEDICINE

## 2024-05-02 PROCEDURE — 96365 THER/PROPH/DIAG IV INF INIT: CPT

## 2024-05-02 PROCEDURE — 84100 ASSAY OF PHOSPHORUS: CPT | Performed by: INTERNAL MEDICINE

## 2024-05-02 PROCEDURE — 93005 ELECTROCARDIOGRAM TRACING: CPT

## 2024-05-02 PROCEDURE — 99285 EMERGENCY DEPT VISIT HI MDM: CPT | Mod: 25

## 2024-05-02 PROCEDURE — 63600175 PHARM REV CODE 636 W HCPCS

## 2024-05-02 PROCEDURE — 96366 THER/PROPH/DIAG IV INF ADDON: CPT

## 2024-05-02 PROCEDURE — 96375 TX/PRO/DX INJ NEW DRUG ADDON: CPT

## 2024-05-02 PROCEDURE — 80053 COMPREHEN METABOLIC PANEL: CPT | Performed by: INTERNAL MEDICINE

## 2024-05-02 PROCEDURE — 25000003 PHARM REV CODE 250: Mod: JZ,JG

## 2024-05-02 RX ORDER — CALCIUM GLUCONATE 20 MG/ML
1 INJECTION, SOLUTION INTRAVENOUS
Status: COMPLETED | OUTPATIENT
Start: 2024-05-02 | End: 2024-05-02

## 2024-05-02 RX ORDER — FUROSEMIDE 10 MG/ML
40 INJECTION INTRAMUSCULAR; INTRAVENOUS
Status: COMPLETED | OUTPATIENT
Start: 2024-05-02 | End: 2024-05-02

## 2024-05-02 RX ORDER — FUROSEMIDE 40 MG/1
40 TABLET ORAL DAILY
Qty: 30 TABLET | Refills: 11 | Status: SHIPPED | OUTPATIENT
Start: 2024-05-02 | End: 2025-05-02

## 2024-05-02 RX ORDER — SODIUM CHLORIDE 9 MG/ML
INJECTION, SOLUTION INTRAVENOUS CONTINUOUS
Status: DISCONTINUED | OUTPATIENT
Start: 2024-05-02 | End: 2024-05-02 | Stop reason: HOSPADM

## 2024-05-02 RX ORDER — SODIUM BICARBONATE 1 MEQ/ML
100 SYRINGE (ML) INTRAVENOUS
Status: COMPLETED | OUTPATIENT
Start: 2024-05-02 | End: 2024-05-02

## 2024-05-02 RX ADMIN — CALCIUM GLUCONATE 1 G: 20 INJECTION, SOLUTION INTRAVENOUS at 03:05

## 2024-05-02 RX ADMIN — HUMAN INSULIN 10 UNITS: 100 INJECTION, SOLUTION SUBCUTANEOUS at 04:05

## 2024-05-02 RX ADMIN — SODIUM BICARBONATE 100 MEQ: 84 INJECTION INTRAVENOUS at 05:05

## 2024-05-02 RX ADMIN — FUROSEMIDE 40 MG: 10 INJECTION, SOLUTION INTRAMUSCULAR; INTRAVENOUS at 05:05

## 2024-05-02 RX ADMIN — SODIUM CHLORIDE: 9 INJECTION, SOLUTION INTRAVENOUS at 05:05

## 2024-05-02 RX ADMIN — DEXTROSE MONOHYDRATE 350 ML: 100 INJECTION, SOLUTION INTRAVENOUS at 03:05

## 2024-05-02 RX ADMIN — SODIUM ZIRCONIUM CYCLOSILICATE 10 G: 10 POWDER, FOR SUSPENSION ORAL at 05:05

## 2024-05-02 NOTE — ED PROVIDER NOTES
Encounter Date: 5/2/2024       History     Chief Complaint   Patient presents with    Abnormal Lab     REPORT OF ABNORMAL LAB WORK DRAWN YESTERDAY. K+ 7.1 NOTED. PT W CO MILD CP.  EKG OBTAINED.       Patient is 59-year-old male with past medical history of HIV (off ART 6 weeks) and ESRD who presents to the ED due to abnormal labs.  Patient was seen in infectious disease clinic yesterday and blood work revealed potassium of 7.1; patient has a well documented history of refusing dialysis.  He is aware that without dialysis he will likely die of an arrhythmia.  Patient states that this is the decision he has made and that he has no interest in changing his mind.  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  Patient stated he can not work on the oil rigs if he is receiving dialysis.  Patient has no complaints today denies chest pain, shortness on breath, nausea, vomiting, palpitations, headache.    The history is provided by the patient.     Review of patient's allergies indicates:  No Known Allergies  Past Medical History:   Diagnosis Date    Human immunodeficiency virus (HIV) disease     Hypertension     Renal disorder      Past Surgical History:   Procedure Laterality Date    BIOPSY OF TISSUE OF NECK Right      Family History   Problem Relation Name Age of Onset    Cancer Mother      No Known Problems Father      No Known Problems Brother      No Known Problems Brother      No Known Problems Brother      No Known Problems Brother       Social History     Tobacco Use    Smoking status: Every Day     Current packs/day: 0.25     Average packs/day: 0.4 packs/day for 84.3 years (31.1 ttl pk-yrs)     Types: Cigarettes     Start date: 1980    Smokeless tobacco: Never   Substance Use Topics    Alcohol use: Not Currently    Drug use: Never     Review of Systems   Constitutional:  Negative for appetite change, chills, diaphoresis, fatigue and  fever.   HENT:  Negative for congestion.    Respiratory:  Negative for shortness of breath, wheezing and stridor.    Cardiovascular:  Negative for chest pain, palpitations and leg swelling.   Gastrointestinal:  Negative for abdominal pain.   Genitourinary:  Negative for difficulty urinating and dysuria.   Musculoskeletal:  Negative for back pain.   Skin:  Negative for rash.   Neurological:  Negative for dizziness, light-headedness and headaches.   Psychiatric/Behavioral:  Negative for agitation, behavioral problems and confusion.        Physical Exam     Initial Vitals [05/02/24 1430]   BP Pulse Resp Temp SpO2   (!) 167/75 76 16 97.9 °F (36.6 °C) 100 %      MAP       --         Physical Exam    Nursing note and vitals reviewed.  Constitutional: He appears well-developed and well-nourished.   HENT:   Head: Normocephalic and atraumatic.   Eyes: EOM are normal. Pupils are equal, round, and reactive to light.   Neck: No tracheal deviation present. No JVD present.   Cardiovascular:  Normal rate and regular rhythm.           Murmur heard.  Systolic grade 2 murmur   Pulmonary/Chest: Breath sounds normal. No respiratory distress. He has no wheezes.   Abdominal: Abdomen is soft. Bowel sounds are normal. He exhibits no distension. There is no abdominal tenderness. There is no rebound.   Musculoskeletal:         General: Normal range of motion.     Neurological: He is alert and oriented to person, place, and time. GCS score is 15. GCS eye subscore is 4. GCS verbal subscore is 5. GCS motor subscore is 6.   Skin: Skin is warm and dry.         ED Course   Critical Care    Date/Time: 5/2/2024 11:35 PM    Performed by: Ge Saldivar MD  Authorized by: Ge Saldivar MD  Direct patient critical care time: 12 minutes  Additional history critical care time: 5 minutes  Ordering / reviewing critical care time: 12 minutes  Documentation critical care time: 5 minutes  Total critical care time (exclusive of  procedural time) : 34 minutes  Critical care was necessary to treat or prevent imminent or life-threatening deterioration of the following conditions: renal failure.  Critical care was time spent personally by me on the following activities: development of treatment plan with patient or surrogate, interpretation of cardiac output measurements, evaluation of patient's response to treatment, examination of patient, obtaining history from patient or surrogate, ordering and performing treatments and interventions, ordering and review of laboratory studies, re-evaluation of patient's condition and review of old charts.        Labs Reviewed   COMPREHENSIVE METABOLIC PANEL - Abnormal; Notable for the following components:       Result Value    Potassium Level 5.8 (*)     Chloride 115 (*)     Carbon Dioxide 13 (*)     Glucose Level 106 (*)     Blood Urea Nitrogen 102.9 (*)     Creatinine 10.59 (*)     Globulin 3.7 (*)     All other components within normal limits   PHOSPHORUS - Abnormal; Notable for the following components:    Phosphorus Level 6.2 (*)     All other components within normal limits   BASIC METABOLIC PANEL - Abnormal; Notable for the following components:    Chloride 113 (*)     Carbon Dioxide 14 (*)     Glucose Level 60 (*)     Blood Urea Nitrogen 99.3 (*)     Creatinine 10.07 (*)     All other components within normal limits   MAGNESIUM - Normal   EXTRA TUBES    Narrative:     The following orders were created for panel order EXTRA TUBES.  Procedure                               Abnormality         Status                     ---------                               -----------         ------                     Light Blue Top Hold[[id number]]                             Final result               Red Top Hold[[id number]]                                    Final result               Light Green Top Hold[[id number]]                            Final result               Lavender Top Hold[[id number]]                                Final result                 Please view results for these tests on the individual orders.   LIGHT BLUE TOP HOLD   RED TOP HOLD   LIGHT GREEN TOP HOLD   LAVENDER TOP HOLD     EKG Readings: (Independently Interpreted)   Initial Reading: No STEMI. Rhythm: Normal Sinus Rhythm.   72 beats per minute     ECG Results              EKG 12-lead (Chest Pain) Age >30 (Final result)        Collection Time Result Time QRS Duration OHS QTC Calculation    05/02/24 14:30:18 05/02/24 16:34:08 88 425                     Final result by Interface, Lab In [facility] (05/02/24 16:34:16)                   Narrative:    Test Reason : R07.9,    Vent. Rate : 071 BPM     Atrial Rate : 071 BPM     P-R Int : 182 ms          QRS Dur : 088 ms      QT Int : 392 ms       P-R-T Axes : 067 025 042 degrees     QTc Int : 425 ms    Normal sinus rhythm  Normal ECG  No previous ECGs available  Confirmed by Jose Luis Shaw MD ([id number]) on 5/2/2024 4:34:03 PM    Referred By:             Confirmed By:Jose Luis Shaw MD                                  Imaging Results    None          Medications   calcium gluconate 1 g in NS IVPB (premixed) (0 g Intravenous Stopped 5/2/24 1751)   dextrose 10% bolus 350 mL 350 mL (0 mLs Intravenous Stopped 5/2/24 1752)   insulin regular injection 10 Units 0.1 mL (10 Units Intravenous Given 5/2/24 1603)   sodium zirconium cyclosilicate packet 10 g (10 g Oral Given 5/2/24 1701)   furosemide injection 40 mg (40 mg Intravenous Given 5/2/24 1701)   sodium bicarbonate 8.4 % (1 mEq/mL) injection 100 mEq (100 mEq Intravenous Given 5/2/24 1701)     Medical Decision Making  Patient is 59-year-old male who presented to the ED due to potassium of 7 seen on labs yesterday ordered by Infectious Disease.  Patient has ESRD and has had multiple conversations about the need for dialysis; patient is fully competent and understands the risk of death by refusing dialysis.  Patient has decided based primarily on his work schedule that  he does not want to have dialysis.  CMP, Mag, EKG and phos were ordered as part of evaluation.  EKG showed normal sinus rhythm; there was no evidence of EKG changes even though patient's potassium is above 7 (on labs yesterday) likely indicating that this is a chronic development.  In the ED Lokelma and Lasix were given. Calcium gluconate was ordered to stabilize cardiac membrane followed by 350 mg of D10 followed by normal insulin 10 units to start shifting potassium.  Patient was also given 100 mEq of sodium bicarb to induce alkalotic state and started on normal saline 200 mL/hour. Since patient is adamant that he does not want dialysis he will be discharged on Lokelma t.i.d. and Lasix daily since he is still making urine.  I will submit referral to Nephrology for help managing patient's ESRD moving forward.  Patient hemodynamically stable at time of discharge.  Repeat chemistry showed decrease in potassium to 4.8.    Amount and/or Complexity of Data Reviewed  External Data Reviewed: notes.     Details: Multiple noted with dialysis refusal, competent  Labs: ordered. Decision-making details documented in ED Course.  ECG/medicine tests: ordered and independent interpretation performed. Decision-making details documented in ED Course.    Risk  OTC drugs.  Prescription drug management.              Attending Attestation:   Physician Attestation Statement for Resident:  As the supervising MD   Physician Attestation Statement: I have personally seen and examined this patient.   I agree with the above history.  -:   As the supervising MD I agree with the above PE.     As the supervising MD I agree with the above treatment, course, plan, and disposition.    I have reviewed and agree with the residents interpretation of the following: lab data and EKG.  I have reviewed the following: old records at this facility.                                       Clinical Impression:  Final diagnoses:  [R07.9] Chest pain  [E87.5]  Hyperkalemia (Primary)  [N18.6, Z99.2] ESRD needing dialysis          ED Disposition Condition    Discharge Stable          ED Prescriptions       Medication Sig Dispense Start Date End Date Auth. Provider    sodium zirconium cyclosilicate (LOKELMA) 10 gram packet Take 1 packet (10 g total) by mouth 3 (three) times daily. Mix entire contents of packet(s) into drinking glass containing 3 tablespoons of water; stir well and drink immediately. Add water and repeat until no powder remains to receive entire dose. 90 packet 5/2/2024 5/2/2025 Myron Live MD    furosemide (LASIX) 40 MG tablet Take 1 tablet (40 mg total) by mouth once daily. 30 tablet 5/2/2024 5/2/2025 Myron Live MD          Follow-up Information       Follow up With Specialties Details Why Contact Info    [facility] Emergency Medicine  As needed [address] [id number] [phone]             Myron Live MD  Resident  05/02/24 [id number]       Ge Saldivar MD  05/02/24 [id number]

## 2024-05-03 LAB — HIV1 RNA # PLAS NAA DL=20: NORMAL COPIES/ML

## 2024-05-07 ENCOUNTER — TELEPHONE (OUTPATIENT)
Dept: INFECTIOUS DISEASES | Facility: CLINIC | Age: 59
End: 2024-05-07
Payer: MEDICAID

## 2024-05-07 NOTE — LETTER
May 14, 2024    Ralf Christianson  [address]  [place] [address]             [facility]  [address] [address]  Phone: [phone] Dear Mr.Carl Christianson:      Good morning Mr. Arambula. This is [place] at [facility] with Geovanna Self, [facility]. I have been trying to reach you just to check on you and followup per Mrs. Austin's request. Please give us a call back at [phone] at your earliest convenience.    Sincerely,        Geovanna Self, APRN

## 2024-05-21 ENCOUNTER — DOCUMENTATION ONLY (OUTPATIENT)
Dept: NEPHROLOGY | Facility: HOSPITAL | Age: 59
End: 2024-05-21
Payer: MEDICAID

## 2024-05-21 NOTE — PROGRESS NOTES
Patient was no-show to clinic today. No-show on 08/31/2022 as well. If patient calls back to reschedule, please schedule 1st available established appointment.  Thank you

## 2024-08-21 ENCOUNTER — OFFICE VISIT (OUTPATIENT)
Dept: INFECTIOUS DISEASES | Facility: CLINIC | Age: 59
End: 2024-08-21
Payer: MEDICAID

## 2024-08-21 ENCOUNTER — TELEPHONE (OUTPATIENT)
Dept: INFECTIOUS DISEASES | Facility: CLINIC | Age: 59
End: 2024-08-21
Payer: MEDICAID

## 2024-08-21 ENCOUNTER — LAB VISIT (OUTPATIENT)
Dept: LAB | Facility: HOSPITAL | Age: 59
End: 2024-08-21
Attending: NURSE PRACTITIONER
Payer: MEDICAID

## 2024-08-21 VITALS
WEIGHT: 205 LBS | HEIGHT: 72 IN | BODY MASS INDEX: 27.77 KG/M2 | TEMPERATURE: 98 F | HEART RATE: 63 BPM | RESPIRATION RATE: 12 BRPM | DIASTOLIC BLOOD PRESSURE: 67 MMHG | SYSTOLIC BLOOD PRESSURE: 131 MMHG

## 2024-08-21 DIAGNOSIS — Z86.19 HISTORY OF SYPHILIS: ICD-10-CM

## 2024-08-21 DIAGNOSIS — E87.5 HYPERKALEMIA: ICD-10-CM

## 2024-08-21 DIAGNOSIS — B20 HIV DISEASE: ICD-10-CM

## 2024-08-21 DIAGNOSIS — N18.6 ESRD (END STAGE RENAL DISEASE): ICD-10-CM

## 2024-08-21 DIAGNOSIS — D63.8 ANEMIA OF CHRONIC DISEASE: ICD-10-CM

## 2024-08-21 DIAGNOSIS — B20 HIV DISEASE: Primary | ICD-10-CM

## 2024-08-21 LAB
ALBUMIN SERPL-MCNC: 4.2 G/DL (ref 3.5–5)
ALBUMIN/GLOB SERPL: 1.1 RATIO (ref 1.1–2)
ALP SERPL-CCNC: 89 UNIT/L (ref 40–150)
ALT SERPL-CCNC: 9 UNIT/L (ref 0–55)
ANION GAP SERPL CALC-SCNC: 10 MEQ/L
ANISOCYTOSIS BLD QL SMEAR: ABNORMAL
AST SERPL-CCNC: 8 UNIT/L (ref 5–34)
BASOPHILS # BLD AUTO: 0.02 X10(3)/MCL
BASOPHILS NFR BLD AUTO: 0.2 %
BILIRUB SERPL-MCNC: 0.3 MG/DL
BUN SERPL-MCNC: 105.1 MG/DL (ref 8.4–25.7)
CALCIUM SERPL-MCNC: 10 MG/DL (ref 8.4–10.2)
CHLORIDE SERPL-SCNC: 115 MMOL/L (ref 98–107)
CO2 SERPL-SCNC: 15 MMOL/L (ref 22–29)
CREAT SERPL-MCNC: 10.48 MG/DL (ref 0.73–1.18)
CREAT/UREA NIT SERPL: 10
EOSINOPHIL # BLD AUTO: 0.28 X10(3)/MCL (ref 0–0.9)
EOSINOPHIL NFR BLD AUTO: 3.1 %
ERYTHROCYTE [DISTWIDTH] IN BLOOD BY AUTOMATED COUNT: 18.2 % (ref 11.5–17)
FERRITIN SERPL-MCNC: 185.15 NG/ML (ref 21.81–274.66)
GFR SERPLBLD CREATININE-BSD FMLA CKD-EPI: 5 ML/MIN/1.73/M2
GLOBULIN SER-MCNC: 3.7 GM/DL (ref 2.4–3.5)
GLUCOSE SERPL-MCNC: 110 MG/DL (ref 74–100)
HCT VFR BLD AUTO: 26 % (ref 42–52)
HGB BLD-MCNC: 7.9 G/DL (ref 14–18)
IMM GRANULOCYTES # BLD AUTO: 0.04 X10(3)/MCL (ref 0–0.04)
IMM GRANULOCYTES NFR BLD AUTO: 0.4 %
IRON SATN MFR SERPL: 28 % (ref 20–50)
IRON SERPL-MCNC: 63 UG/DL (ref 65–175)
LYMPHOCYTES # BLD AUTO: 1.82 X10(3)/MCL (ref 0.6–4.6)
LYMPHOCYTES NFR BLD AUTO: 20.2 %
MCH RBC QN AUTO: 22.5 PG (ref 27–31)
MCHC RBC AUTO-ENTMCNC: 30.4 G/DL (ref 33–36)
MCV RBC AUTO: 74.1 FL (ref 80–94)
MONOCYTES # BLD AUTO: 0.67 X10(3)/MCL (ref 0.1–1.3)
MONOCYTES NFR BLD AUTO: 7.4 %
NEUTROPHILS # BLD AUTO: 6.17 X10(3)/MCL (ref 2.1–9.2)
NEUTROPHILS NFR BLD AUTO: 68.7 %
NRBC BLD AUTO-RTO: 0 %
PLATELET # BLD AUTO: 204 X10(3)/MCL (ref 130–400)
PLATELET # BLD EST: ADEQUATE 10*3/UL
PMV BLD AUTO: 9.8 FL (ref 7.4–10.4)
POTASSIUM SERPL-SCNC: 6.5 MMOL/L (ref 3.5–5.1)
PROT SERPL-MCNC: 7.9 GM/DL (ref 6.4–8.3)
RBC # BLD AUTO: 3.51 X10(6)/MCL (ref 4.7–6.1)
RET# (OHS): 0.08 X10E6/UL (ref 0.03–0.1)
RETICULOCYTE COUNT AUTOMATED (OLG): 2.28 % (ref 1.1–2.1)
RPR SER QL: REACTIVE
RPR SER-TITR: ABNORMAL {TITER}
SCHISTOCYTE (OLG): ABNORMAL
SODIUM SERPL-SCNC: 140 MMOL/L (ref 136–145)
SPHEROCYTES BLD QL SMEAR: SLIGHT
T PALLIDUM AB SER QL: REACTIVE
TIBC SERPL-MCNC: 163 UG/DL (ref 69–240)
TIBC SERPL-MCNC: 226 UG/DL (ref 250–450)
TRANSFERRIN SERPL-MCNC: 200 MG/DL (ref 174–364)
WBC # BLD AUTO: 9 X10(3)/MCL (ref 4.5–11.5)

## 2024-08-21 PROCEDURE — 80053 COMPREHEN METABOLIC PANEL: CPT

## 2024-08-21 PROCEDURE — 1160F RVW MEDS BY RX/DR IN RCRD: CPT | Mod: CPTII,,, | Performed by: NURSE PRACTITIONER

## 2024-08-21 PROCEDURE — 99214 OFFICE O/P EST MOD 30 MIN: CPT | Mod: PBBFAC | Performed by: NURSE PRACTITIONER

## 2024-08-21 PROCEDURE — 3008F BODY MASS INDEX DOCD: CPT | Mod: CPTII,,, | Performed by: NURSE PRACTITIONER

## 2024-08-21 PROCEDURE — 86592 SYPHILIS TEST NON-TREP QUAL: CPT

## 2024-08-21 PROCEDURE — 1159F MED LIST DOCD IN RCRD: CPT | Mod: CPTII,,, | Performed by: NURSE PRACTITIONER

## 2024-08-21 PROCEDURE — 3075F SYST BP GE 130 - 139MM HG: CPT | Mod: CPTII,,, | Performed by: NURSE PRACTITIONER

## 2024-08-21 PROCEDURE — 99214 OFFICE O/P EST MOD 30 MIN: CPT | Mod: S$PBB,,, | Performed by: NURSE PRACTITIONER

## 2024-08-21 PROCEDURE — 86780 TREPONEMA PALLIDUM: CPT

## 2024-08-21 PROCEDURE — 83550 IRON BINDING TEST: CPT

## 2024-08-21 PROCEDURE — 82728 ASSAY OF FERRITIN: CPT

## 2024-08-21 PROCEDURE — 87536 HIV-1 QUANT&REVRSE TRNSCRPJ: CPT

## 2024-08-21 PROCEDURE — 85025 COMPLETE CBC W/AUTO DIFF WBC: CPT

## 2024-08-21 PROCEDURE — 36415 COLL VENOUS BLD VENIPUNCTURE: CPT

## 2024-08-21 PROCEDURE — 85045 AUTOMATED RETICULOCYTE COUNT: CPT

## 2024-08-21 PROCEDURE — 83540 ASSAY OF IRON: CPT

## 2024-08-21 PROCEDURE — 3078F DIAST BP <80 MM HG: CPT | Mod: CPTII,,, | Performed by: NURSE PRACTITIONER

## 2024-08-21 RX ORDER — FUROSEMIDE 40 MG/1
40 TABLET ORAL DAILY
Qty: 30 TABLET | Refills: 11 | Status: SHIPPED | OUTPATIENT
Start: 2024-08-21 | End: 2025-08-21

## 2024-08-21 NOTE — PROGRESS NOTES
Patient ID: Ralf Christianson 59 y.o.     Chief Complaint:   Chief Complaint   Patient presents with    Followup HIV     Denies problems        HPI:    8/21/24  Ralf is a 58 yo AAM here today for HIV f/u visit.  He is taking Prezcobix & Tivicay daily, tolerates well. Labs 5/1/24 VL UD, Cd4 438. He did go to ED after last visit for chronic hyperkalemia and did receive treatment for same. He was prescribed Lokelma and Lasix from ED MD, but did not receive due to questions from pharmacy regarding the Lokelma dosage. Corrected dose and sent new prescription today. He does have some intermittent chest discomforts and muscle pain, but none at present. Again, he is aware of the gravity of his ESRD and is amenable to medication management and optimization, but definitively not interested in dialysis as recommended. All questions answered & concerns addressed.    5/1/24  Ralf is a 58 yo AAM presenting today for HIV f/u visit. He has been off ART for about 6 weeks now due to lack of f/u. He has been tolerating Edurant, Prezcobix, and Tivicay well without any noted side effects. Labs 6/23 VL Ud, CD4 330.  He tested positive for syphilis with titer of 8 dils 6/23, completed doxycycline as prescribed. Will repeat titer today. He declines HD for ESRD as recommended. Denies any chest pain, shortness of breath, or palpitations. He does have some generalized muscle aches. He tells me that he is still urinating in large amounts, pale yellow. Denies any s/s of UTI. He has cut all salt from his diet, eating more fruit and vegetables, and drinking more water. He is comfortable with his decision to decline dialysis and consequences of same. He is clear headed and under no duress. Advised pt to notify me if he changes his mind, or desires palliative care for comfort measures. Voiced understanding & appreciation.      6/26/23  Ralf is a 57 yo AAM here today for HIV f/u visit.  He tells me that he ran out of all medications including ART about 6  weeks ago due to missed appointments.  He tolerates Edurant, Prezcobix, and Tivicay well.  Last labs7/22 VL UD, Cd4 248, RPR NR.  He has known renal failure and has already been evaluated by nephrology.  He declines any further evaluation for same at this point.  He does not want dialysis and is aware that renal failure untreated will eventually result in death.  He has come to terms with this prognosis and accepts the consequences of same. Will update labs today.  He tells me that he still has not secure reliable housing, staying with a friend here & there but is having trouble getting a good night's rest. He was driven here today by a friend of a friend.  [facility] was not able to assist with housing as referred last visit.  Will refer to [facility] today for assistance with food and housing insecurity. Voiced appreciation. All questions answered & concerns addressed.           Past Medical History:   Diagnosis Date    Human immunodeficiency virus (HIV) disease     Hypertension     Renal disorder         Past Surgical History:   Procedure Laterality Date    BIOPSY OF TISSUE OF NECK Right         Social History     Socioeconomic History    Marital status: Single   Tobacco Use    Smoking status: Every Day     Current packs/day: 0.25     Average packs/day: 0.4 packs/day for 84.6 years (31.2 ttl pk-yrs)     Types: Cigarettes     Start date: 1980    Smokeless tobacco: Never   Substance and Sexual Activity    Alcohol use: Not Currently    Drug use: Never    Sexual activity: Yes     Partners: Female     Birth control/protection: None        Family History   Problem Relation Name Age of Onset    Cancer Mother      No Known Problems Father      No Known Problems Brother      No Known Problems Brother      No Known Problems Brother      No Known Problems Brother          Review of patient's allergies indicates:  No Known Allergies     Immunization History   Administered Date(s) Administered    COVID-19, vector-nr, rS-Ad26,  PF (Genevieve) 06/10/2021    Influenza 01/09/2014    Influenza - Quadrivalent 01/04/2021, 11/03/2021    Influenza - Quadrivalent - PF (6-35 months) 12/20/2017, 02/19/2020    Influenza - Quadrivalent - PF *Preferred* (6 months and older) 11/23/2010, 11/15/2011, 01/04/2021, 11/03/2021    Influenza - Trivalent (ADULT) 11/23/2010    Influenza - Trivalent - PF (ADULT) 11/15/2011    Influenza A (H1N1) 2009 Monovalent - IM 03/01/2010    Meningococcal Conjugate (MCV4P) 05/23/2019, 08/23/2019    Pneumococcal Polysaccharide - 23 Valent 07/17/2020        Review of Systems   Constitutional: Negative.    HENT: Negative.     Eyes: Negative.    Respiratory: Negative.     Cardiovascular: Negative.    Gastrointestinal: Negative.    Genitourinary: Negative.    Musculoskeletal: Negative.    Skin: Negative.    Neurological: Negative.    Endo/Heme/Allergies: Negative.    Psychiatric/Behavioral: Negative.     All other systems reviewed and are negative.         Objective:      /67 (BP Location: Left arm, Patient Position: Sitting, BP Method: Large (Automatic))   Pulse 63   Temp 97.8 °F (36.6 °C) (Oral)   Resp 12   Ht 6' (1.829 m)   Wt 93 kg (205 lb 0.4 oz)   BMI 27.81 kg/m²      Physical Exam  Vitals reviewed.   Constitutional:       General: He is not in acute distress.     Appearance: Normal appearance. He is not toxic-appearing.   HENT:      Mouth/Throat:      Mouth: Mucous membranes are moist.      Pharynx: Oropharynx is clear.   Eyes:      Conjunctiva/sclera: Conjunctivae normal.   Cardiovascular:      Rate and Rhythm: Normal rate and regular rhythm.      Heart sounds: Murmur heard.   Pulmonary:      Effort: Pulmonary effort is normal. No respiratory distress.      Breath sounds: Normal breath sounds.   Abdominal:      General: Abdomen is flat. Bowel sounds are normal.      Palpations: Abdomen is soft.   Musculoskeletal:         General: Normal range of motion.      Cervical back: Normal range of motion.   Lymphadenopathy:       Cervical: No cervical adenopathy.   Skin:     General: Skin is warm and dry.   Neurological:      General: No focal deficit present.      Mental Status: He is alert and oriented to person, place, and time. Mental status is at baseline.   Psychiatric:         Mood and Affect: Mood normal.         Behavior: Behavior normal.          Labs:   Lab Results   Component Value Date    WBC 9.00 08/21/2024    HGB 7.9 (L) 08/21/2024    HCT 26.0 (L) 08/21/2024    MCV 74.1 (L) 08/21/2024     08/21/2024       CMP  Sodium   Date Value Ref Range Status   08/21/2024 140 136 - 145 mmol/L Final     Potassium   Date Value Ref Range Status   08/21/2024 6.5 (HH) 3.5 - 5.1 mmol/L Final     Comment:     Critical Result called and verified by verbal readback to: Aileen Lopez LPN on 08/21/2024 at 11:54. Reported by [id number].     Chloride   Date Value Ref Range Status   08/21/2024 115 (H) 98 - 107 mmol/L Final     CO2   Date Value Ref Range Status   08/21/2024 15 (L) 22 - 29 mmol/L Final     Blood Urea Nitrogen   Date Value Ref Range Status   08/21/2024 105.1 (H) 8.4 - 25.7 mg/dL Final     Creatinine   Date Value Ref Range Status   08/21/2024 10.48 (H) 0.73 - 1.18 mg/dL Final     Calcium   Date Value Ref Range Status   08/21/2024 10.0 8.4 - 10.2 mg/dL Final     Albumin   Date Value Ref Range Status   08/21/2024 4.2 3.5 - 5.0 g/dL Final     Bilirubin Total   Date Value Ref Range Status   08/21/2024 0.3 <=1.5 mg/dL Final     ALP   Date Value Ref Range Status   08/21/2024 89 40 - 150 unit/L Final     AST   Date Value Ref Range Status   08/21/2024 8 5 - 34 unit/L Final     ALT   Date Value Ref Range Status   08/21/2024 9 0 - 55 unit/L Final     eGFR   Date Value Ref Range Status   08/21/2024 5 mL/min/1.73/m2 Final     Lab Results   Component Value Date    TSH 1.199 06/26/2023     Hep C Ab Interp   Date Value Ref Range Status   07/17/2020 Reactive (A) >Nonreactive Final     Syphilis Antibody   Date Value Ref Range Status   05/01/2024  Reactive (A) Nonreactive, Equivocal Final     RPR   Date Value Ref Range Status   05/01/2024 Reactive (A) Non-Reactive Final     RPR Titer   Date Value Ref Range Status   06/26/2023 8 dils (A) (none) Final     Cholesterol Total   Date Value Ref Range Status   06/26/2023 109 <=200 mg/dL Final     HDL Cholesterol   Date Value Ref Range Status   06/26/2023 35 35 - 60 mg/dL Final     Triglyceride   Date Value Ref Range Status   06/26/2023 76 34 - 140 mg/dL Final     Cholesterol/HDL Ratio   Date Value Ref Range Status   06/26/2023 3 0 - 5 Final     Very Low Density Lipoprotein   Date Value Ref Range Status   06/26/2023 15  Final     LDL Cholesterol   Date Value Ref Range Status   06/26/2023 59.00 50.00 - 140.00 mg/dL Final     Vitamin D   Date Value Ref Range Status   06/26/2023 23.9 (L) 30.0 - 80.0 ng/mL Final     Results for orders placed or performed in visit on 06/26/23   CD4 Lymphocytes   Result Value Ref Range    Patient Age 58     WBC Absolute 8,640 4,500 - 11,500 /mm3    Lymph Percent 24 (L) 28 - 48 %    Lymph Absolute 2,073.6 1,260 - 5,520 x10(3)/mcL    CD4 % 15.9 %    CD4 Absolute 329.7024 (L) 589 - 1,505 unit/L    T Cell Interp       Normal absolute lymphocyte count with decreased absolute CD4+ lymphocyte count.    French Dixon M.D.        Results for orders placed or performed in visit on 05/01/24   HIV-1 RNA, Quantitative, PCR with Reflex to Genotype   Result Value Ref Range    HIV-1 RNA Detect/Quant, P Undetected Undetected copies/mL     Results for orders placed or performed in visit on 06/26/23   Quantiferon Gold TB   Result Value Ref Range    QuantiFERON-Tb Gold Plus Result Negative Negative    TB1 Ag minus Nil Result 0.01 IU/mL    TB2 Ag minus Nil Result 0.02 IU/mL    Mitogen minus Nil Result >10.00 IU/mL    Nil Result 0.02 IU/mL     No results found for this or any previous visit.  Results for orders placed or performed in visit on 05/01/24   Urinalysis   Result Value Ref Range    Color, UA  Colorless (A) Yellow, Light-Yellow, Dark Yellow, Danielle, Straw    Appearance, UA Clear Clear    Specific Gravity, UA 1.012 1.005 - 1.030    pH, UA 5.5 5.0 - 8.5    Protein, UA 1+ (A) Negative    Glucose, UA Normal Negative, Normal    Ketones, UA Negative Negative    Blood, UA Trace (A) Negative    Bilirubin, UA Negative Negative    Urobilinogen, UA Normal 0.2, 1.0, Normal    Nitrites, UA Negative Negative    Leukocyte Esterase, UA 25 (A) Negative    WBC, UA 6-10 (A) None Seen, 0-2, 3-5, 0-5 /HPF    Bacteria, UA None Seen None Seen /HPF    Squamous Epithelial Cells, UA Trace (A) None Seen /HPF    Hyaline Casts, UA None Seen None Seen /lpf    RBC, UA 0-5 None Seen, 0-2, 3-5, 0-5 /HPF       Imaging: Reviewed most recent relevant imaging studies available, notable results highlighted in this note    Medications:     Current Outpatient Medications   Medication Instructions    allopurinoL (ZYLOPRIM) 100 MG tablet TAKE one-half (50 MG) tablet BY MOUTH EVERY DAY    amLODIPine (NORVASC) 10 mg, Oral, Daily    atorvastatin (LIPITOR) 20 mg, Oral, Daily    furosemide (LASIX) 40 mg, Oral, Daily    metoprolol succinate (TOPROL-XL) 25 mg, Oral, Daily    PREZCOBIX 800-150 mg-mg Tab tablet 1 tablet, Oral, Daily    sildenafil (REVATIO) 20 mg, Oral, Daily PRN    sodium zirconium cyclosilicate (LOKELMA) 10 g, Oral, 3 times daily, For 48 hours, then take one packet every day. Mix entire contents of packet(s) into drinking glass containing 3 tablespoons of water; stir well and drink immediately. Add water and repeat until no powder remains to receive entire dose.    TIVICAY 50 mg, Oral, Daily       Assessment:       Problem List Items Addressed This Visit    None  Visit Diagnoses       HIV disease    -  Primary    Relevant Orders    HIV-1 RNA, Quantitative, PCR with Reflex to Genotype    Hyperkalemia        Relevant Medications    sodium zirconium cyclosilicate (LOKELMA) 10 gram packet    furosemide (LASIX) 40 MG tablet    Other Relevant  Orders    Comprehensive Metabolic Panel (Completed)    ESRD (end stage renal disease)        Relevant Orders    Comprehensive Metabolic Panel (Completed)    CBC Auto Differential (Completed)    Anemia of chronic disease        Relevant Orders    Ferritin    Iron and TIBC (Completed)    Reticulocytes (Completed)    History of syphilis        Relevant Orders    SYPHILIS ANTIBODY (WITH REFLEX RPR)               Plan:      HIV disease  -     HIV-1 RNA, Quantitative, PCR with Reflex to Genotype; Future; Expected date: 08/21/2024  Diagnosed 1992.  CD4 Chris 35 (7/09)  ART History: Kaletra, Isentress, Combivir  Changed to Combivir, Prezista, Norvir, Tivicay 11/2016 for simplification. Revised to Edurant, Prezcobix, Tivicay 12/17 due to NRTI class resistance.  Available Historic Genotypes scanned into chart 5/1/24.     Adherence and sexual health counseling done.  Use condoms for all sexual encounters.  Blood precautions.   ContinueTivicay 50 mg daily & Prezcobix 1 tab daily as prescribed.  Labs today.  RTC 3 months with Geovanna.    Hyperkalemia  -     sodium zirconium cyclosilicate (LOKELMA) 10 gram packet; Take 1 packet (10 g total) by mouth 3 (three) times daily. For 48 hours, then take one packet every day. Mix entire contents of packet(s) into drinking glass containing 3 tablespoons of water; stir well and drink immediately. Add water and repeat until no powder remains to receive entire dose.  Dispense: 36 packet; Refill: 11  -     furosemide (LASIX) 40 MG tablet; Take 1 tablet (40 mg total) by mouth once daily.  Dispense: 30 tablet; Refill: 11  -     Comprehensive Metabolic Panel; Future; Expected date: 08/21/2024  Labs today.   Lokelma 3 times per day x 2 days, then 1 pkt per day.   Lasix 40 mg daily as previously prescribed by ED provider.     ESRD (end stage renal disease)  -     Comprehensive Metabolic Panel; Future; Expected date: 08/21/2024  -     CBC Auto Differential; Future; Expected date: 08/21/2024  Declines  any further nephrology evaluation or intervention at this juncture.   Will notify me if he should change decision.   Accepts eventual fatal consequence of this choice.   Pt's decision respected.    Anemia of chronic disease  -     Ferritin; Future; Expected date: 08/21/2024  -     Iron and TIBC; Future; Expected date: 08/21/2024  -     Reticulocytes; Future; Expected date: 08/21/2024  Iron panel today.     History of syphilis  -     SYPHILIS ANTIBODY (WITH REFLEX RPR); Future; Expected date: 08/21/2024  RPR titer 8 dils 6/23.  Treated with doxycycline due to national shortage of Bicillin.  Repeat titer today.                Today's CBC & CMP results received.   Potassium 6.5, Creatinine 10.48, eGFR 5, Hgb 7.9, Hct 26.  Phoned pt with critical results. Denies any chest pain, palpitations, shortness of breath, or active bleeding.   Endorses myalgias.   Advised pt to go to nearest ED for further evaluation. Informed pt that he can receive treatment for hyperkalemia for temporary improvement if desired without dialysis. Compassionately informed pt that dialysis is ultimately recommended for treatment of his progressing condition and will be fatal if not addressed.   Voiced understanding & tells me that he will go to nearest ED for evaluation tomorrow.

## 2024-08-21 NOTE — TELEPHONE ENCOUNTER
Received critical potassium 6.5 per Judith (lab), Geovanna Self NP informed and gave orders to notify pt and have him go to ER for evaluation. Called pt and informed on critical potassium of 6.5 and informed to go to ER for evaluation, he stated he already made it home and will go tomorrow. Denies elena chest pain or SOB at this time, NOLAN Self NP informed.

## 2024-08-22 ENCOUNTER — TELEPHONE (OUTPATIENT)
Dept: INFECTIOUS DISEASES | Facility: CLINIC | Age: 59
End: 2024-08-22
Payer: MEDICAID

## 2024-08-22 DIAGNOSIS — N18.5 ANEMIA OF CHRONIC RENAL FAILURE, STAGE 5: Primary | ICD-10-CM

## 2024-08-22 DIAGNOSIS — D63.1 ANEMIA OF CHRONIC RENAL FAILURE, STAGE 5: Primary | ICD-10-CM

## 2024-08-22 LAB — HIV1 RNA # PLAS NAA DL=20: <20 COPIES/ML

## 2024-08-22 RX ORDER — FERROUS SULFATE 325(65) MG
325 TABLET ORAL DAILY
Qty: 90 TABLET | Refills: 3 | Status: SHIPPED | OUTPATIENT
Start: 2024-08-22 | End: 2025-08-22

## 2024-08-22 NOTE — TELEPHONE ENCOUNTER
Additional lab results reviewed. Phoned pt with results.     Anemia of CKD confirmed. Will treat with Ferrous Sulfate 325 mg daily on an empty stomach. Prescription sent to [facility] as requested.    Syphilis titer increased from 8 dils to 16 dils. Previously treated with doxycycline 6/2023 due to Bicillin national shortage at that time. Recommend retreatment with Bicillin 2.4 mil units IM.     He is going to an [facility] facility in [place] today for evaluation and treatment of chronic hyperkalemia s/t ESRD. He was previously treated in ED for same 3 months ago & was prescribed oral management with Lokelma and Lasix.  However, prescriptions were not filled by pharmacy due to questions regarding the prescriptions and inability to reach the prescriber. New prescriptions sent 8/21/24 for chronic management of hyperkalemia to start after ED treatment today.     Of note, he refuses HD and is aware of dire consequences of this. He prefers treatments to maintain quality of life as long as possible, without HD. Wishes respected.

## 2024-11-04 DIAGNOSIS — B20 HIV DISEASE: ICD-10-CM

## 2024-11-04 RX ORDER — DARUNAVIR ETHANOLATE AND COBICISTAT 800; 150 MG/1; MG/1
1 TABLET, FILM COATED ORAL
Qty: 90 TABLET | Refills: 1 | Status: SHIPPED | OUTPATIENT
Start: 2024-11-04

## 2024-11-04 RX ORDER — DOLUTEGRAVIR SODIUM 50 MG/1
1 TABLET, FILM COATED ORAL
Qty: 90 TABLET | Refills: 1 | Status: SHIPPED | OUTPATIENT
Start: 2024-11-04

## 2024-11-05 DIAGNOSIS — I10 ESSENTIAL (PRIMARY) HYPERTENSION: ICD-10-CM

## 2024-11-05 RX ORDER — AMLODIPINE BESYLATE 10 MG/1
10 TABLET ORAL
Qty: 90 TABLET | Refills: 1 | Status: SHIPPED | OUTPATIENT
Start: 2024-11-05

## 2024-12-12 ENCOUNTER — TELEPHONE (OUTPATIENT)
Dept: INFECTIOUS DISEASES | Facility: CLINIC | Age: 59
End: 2024-12-12

## 2024-12-12 ENCOUNTER — OFFICE VISIT (OUTPATIENT)
Dept: INFECTIOUS DISEASES | Facility: CLINIC | Age: 59
End: 2024-12-12
Payer: MEDICAID

## 2024-12-12 ENCOUNTER — HOSPITAL ENCOUNTER (OUTPATIENT)
Dept: RADIOLOGY | Facility: HOSPITAL | Age: 59
Discharge: HOME | End: 2024-12-12
Attending: NURSE PRACTITIONER
Payer: MEDICAID

## 2024-12-12 VITALS
BODY MASS INDEX: 27.21 KG/M2 | SYSTOLIC BLOOD PRESSURE: 133 MMHG | WEIGHT: 200.94 LBS | RESPIRATION RATE: 18 BRPM | HEIGHT: 72 IN | TEMPERATURE: 98 F | HEART RATE: 73 BPM | DIASTOLIC BLOOD PRESSURE: 63 MMHG

## 2024-12-12 DIAGNOSIS — R05.8 COUGH PRODUCTIVE OF YELLOW SPUTUM: ICD-10-CM

## 2024-12-12 DIAGNOSIS — B20 HIV DISEASE: Primary | ICD-10-CM

## 2024-12-12 DIAGNOSIS — A53.9 SYPHILIS (ACQUIRED): ICD-10-CM

## 2024-12-12 PROCEDURE — 71046 X-RAY EXAM CHEST 2 VIEWS: CPT | Mod: TC

## 2024-12-12 PROCEDURE — 99214 OFFICE O/P EST MOD 30 MIN: CPT | Mod: PBBFAC,25 | Performed by: NURSE PRACTITIONER

## 2024-12-12 PROCEDURE — 96372 THER/PROPH/DIAG INJ SC/IM: CPT | Mod: PBBFAC

## 2024-12-12 PROCEDURE — 87070 CULTURE OTHR SPECIMN AEROBIC: CPT | Performed by: NURSE PRACTITIONER

## 2024-12-12 RX ORDER — DARUNAVIR ETHANOLATE AND COBICISTAT 800; 150 MG/1; MG/1
1 TABLET, FILM COATED ORAL DAILY
Qty: 90 TABLET | Refills: 1 | Status: SHIPPED | OUTPATIENT
Start: 2024-12-12

## 2024-12-12 RX ORDER — DOLUTEGRAVIR SODIUM 50 MG/1
1 TABLET, FILM COATED ORAL DAILY
Qty: 90 TABLET | Refills: 1 | Status: SHIPPED | OUTPATIENT
Start: 2024-12-12

## 2024-12-12 RX ADMIN — PENICILLIN G BENZATHINE 2.4 MILLION UNITS: 2400000 INJECTION, SUSPENSION INTRAMUSCULAR at 02:12

## 2024-12-12 NOTE — TELEPHONE ENCOUNTER
Lab results reviewed.  Hgb & Hct decreased further to 7.3 & 24.4.   Potassium 5.3, creatinine 11.28. Phoned pt with results. He does have known ESRD and has declined any type of dialysis treatment for same as previously documented.  He tells me that he had stopping taking iron supplement and will resume with BID dosing as advised. Denies any shortness of breath or palpitations. ED precautions discussed, voiced understanding. He is taking Lokelma daily, will increase to TID dosing for 2 days then resume once daily dosing.  Voiced understanding & appreciation.

## 2024-12-12 NOTE — PROGRESS NOTES
Patient ID: Ralf Christianson 59 y.o.     Chief Complaint:   Chief Complaint   Patient presents with    Follow-up     B20        HPI:    12/12/24  Ralf is a 60 yo AAM presenting today for HIV f/u visit. He takes Prezcobix & Tivicay daily with viral suppression. Labs 8/21/24 VL <20, RPR 16 dils, 5/24 Cd4 438.  He is taking Lokelma & Lasix daily as prescribed as well. He tells me that he has been having nasal congestion with cough for 1 week now.  The cough has worsened and is producing yellow sputum. Denies fever or chills. No sore throat or ear pain. Does have some sinus pain. He tells me that he did not go to [facility] for syphilis treatment as discussed. Appreciates Bicillin injection today. All questions answered & concerns addressed.    8/21/24  Ralf is a 60 yo AAM here today for HIV f/u visit.  He is taking Prezcobix & Tivicay daily, tolerates well. Labs 5/1/24 VL UD, Cd4 438. He did go to ED after last visit for chronic hyperkalemia and did receive treatment for same. He was prescribed Lokelma and Lasix from ED MD, but did not receive due to questions from pharmacy regarding the Lokelma dosage. Corrected dose and sent new prescription today. He does have some intermittent chest discomforts and muscle pain, but none at present. Again, he is aware of the gravity of his ESRD and is amenable to medication management and optimization, but definitively not interested in dialysis as recommended. All questions answered & concerns addressed.     5/1/24  Ralf is a 60 yo AAM presenting today for HIV f/u visit. He has been off ART for about 6 weeks now due to lack of f/u. He has been tolerating Edurant, Prezcobix, and Tivicay well without any noted side effects. Labs 6/23 VL Ud, CD4 330.  He tested positive for syphilis with titer of 8 dils 6/23, completed doxycycline as prescribed. Will repeat titer today. He declines HD for ESRD as recommended. Denies any chest pain, shortness of breath, or palpitations. He does have  some generalized muscle aches. He tells me that he is still urinating in large amounts, pale yellow. Denies any s/s of UTI. He has cut all salt from his diet, eating more fruit and vegetables, and drinking more water. He is comfortable with his decision to decline dialysis and consequences of same. He is clear headed and under no duress. Advised pt to notify me if he changes his mind, or desires palliative care for comfort measures. Voiced understanding & appreciation.            Past Medical History:   Diagnosis Date    Human immunodeficiency virus (HIV) disease     Hypertension     Renal disorder         Past Surgical History:   Procedure Laterality Date    BIOPSY OF TISSUE OF NECK Right         Social History     Socioeconomic History    Marital status: Single   Tobacco Use    Smoking status: Every Day     Average packs/day: 1.5 packs/day for 44.9 years (67.4 ttl pk-yrs)     Types: Cigarettes     Start date: 1980    Smokeless tobacco: Never   Substance and Sexual Activity    Alcohol use: Not Currently    Drug use: Never    Sexual activity: Yes     Partners: Female     Birth control/protection: None        Family History   Problem Relation Name Age of Onset    Cancer Mother      No Known Problems Father      No Known Problems Brother      No Known Problems Brother      No Known Problems Brother      No Known Problems Brother          Review of patient's allergies indicates:  No Known Allergies     Immunization History   Administered Date(s) Administered    COVID-19, vector-nr, rS-Ad26, PF (Genevieve) 06/10/2021    Influenza 01/09/2014    Influenza - Quadrivalent 01/04/2021, 11/03/2021    Influenza - Quadrivalent - PF (6-35 months) 12/20/2017, 02/19/2020    Influenza - Quadrivalent - PF *Preferred* (6 months and older) 11/23/2010, 11/15/2011, 01/04/2021, 11/03/2021    Influenza - Trivalent - Afluria, Fluzone MDV 11/23/2010    Influenza - Trivalent - Fluarix, Flulaval, Fluzone, Afluria - PF 11/15/2011    Influenza A  (H1N1) 2009 Monovalent - IM 03/01/2010    Meningococcal Conjugate (MCV4P) 05/23/2019, 08/23/2019    Pneumococcal Polysaccharide - 23 Valent 07/17/2020        Review of Systems   Constitutional:  Positive for malaise/fatigue. Negative for chills, diaphoresis, fever and weight loss.   HENT: Negative.     Eyes: Negative.    Respiratory:  Positive for cough and sputum production. Negative for hemoptysis, shortness of breath and wheezing.    Cardiovascular: Negative.    Gastrointestinal: Negative.    Genitourinary: Negative.    Musculoskeletal: Negative.    Skin: Negative.    Neurological: Negative.    Endo/Heme/Allergies: Negative.    Psychiatric/Behavioral: Negative.     All other systems reviewed and are negative.         Objective:      /63 (BP Location: Right arm, Patient Position: Sitting)   Pulse 73   Temp 98 °F (36.7 °C) (Oral)   Resp 18   Ht 6' (1.829 m)   Wt 91.2 kg (200 lb 15.2 oz)   BMI 27.25 kg/m²      Physical Exam  Vitals reviewed.   Constitutional:       General: He is not in acute distress.     Appearance: Normal appearance. He is not toxic-appearing.   HENT:      Mouth/Throat:      Mouth: Mucous membranes are moist.      Pharynx: Oropharynx is clear.   Eyes:      Conjunctiva/sclera: Conjunctivae normal.   Cardiovascular:      Rate and Rhythm: Normal rate and regular rhythm.   Pulmonary:      Effort: Pulmonary effort is normal. No respiratory distress.      Breath sounds: No stridor. Rhonchi present. No wheezing or rales.   Chest:      Chest wall: No tenderness.   Abdominal:      General: Abdomen is flat. Bowel sounds are normal.      Palpations: Abdomen is soft.   Musculoskeletal:         General: Normal range of motion.      Cervical back: Normal range of motion.   Lymphadenopathy:      Cervical: No cervical adenopathy.   Skin:     General: Skin is warm and dry.   Neurological:      General: No focal deficit present.      Mental Status: He is alert and oriented to person, place, and time.  Mental status is at baseline.   Psychiatric:         Mood and Affect: Mood normal.         Behavior: Behavior normal.          Labs:   Lab Results   Component Value Date    WBC 9.00 08/21/2024    HGB 7.9 (L) 08/21/2024    HCT 26.0 (L) 08/21/2024    MCV 74.1 (L) 08/21/2024     08/21/2024       CMP  Sodium   Date Value Ref Range Status   08/21/2024 140 136 - 145 mmol/L Final     Potassium   Date Value Ref Range Status   08/21/2024 6.5 (HH) 3.5 - 5.1 mmol/L Final     Comment:     Critical Result called and verified by verbal readback to: Aileen Lopez LPN on 08/21/2024 at 11:54. Reported by [id number].     Chloride   Date Value Ref Range Status   08/21/2024 115 (H) 98 - 107 mmol/L Final     CO2   Date Value Ref Range Status   08/21/2024 15 (L) 22 - 29 mmol/L Final     Blood Urea Nitrogen   Date Value Ref Range Status   08/21/2024 105.1 (H) 8.4 - 25.7 mg/dL Final     Creatinine   Date Value Ref Range Status   08/21/2024 10.48 (H) 0.73 - 1.18 mg/dL Final     Calcium   Date Value Ref Range Status   08/21/2024 10.0 8.4 - 10.2 mg/dL Final     Albumin   Date Value Ref Range Status   08/21/2024 4.2 3.5 - 5.0 g/dL Final     Bilirubin Total   Date Value Ref Range Status   08/21/2024 0.3 <=1.5 mg/dL Final     ALP   Date Value Ref Range Status   08/21/2024 89 40 - 150 unit/L Final     AST   Date Value Ref Range Status   08/21/2024 8 5 - 34 unit/L Final     ALT   Date Value Ref Range Status   08/21/2024 9 0 - 55 unit/L Final     eGFR   Date Value Ref Range Status   08/21/2024 5 mL/min/1.73/m2 Final     Lab Results   Component Value Date    TSH 1.199 06/26/2023     Hep C Ab Interp   Date Value Ref Range Status   07/17/2020 Reactive (A) >Nonreactive Final     Syphilis Antibody   Date Value Ref Range Status   08/21/2024 Reactive (A) Nonreactive, Equivocal Final     RPR   Date Value Ref Range Status   08/21/2024 Reactive (A) Non-Reactive Final     RPR Titer   Date Value Ref Range Status   08/21/2024 16 dils (A) (none) Final      Cholesterol Total   Date Value Ref Range Status   06/26/2023 109 <=200 mg/dL Final     HDL Cholesterol   Date Value Ref Range Status   06/26/2023 35 35 - 60 mg/dL Final     Triglyceride   Date Value Ref Range Status   06/26/2023 76 34 - 140 mg/dL Final     Cholesterol/HDL Ratio   Date Value Ref Range Status   06/26/2023 3 0 - 5 Final     Very Low Density Lipoprotein   Date Value Ref Range Status   06/26/2023 15  Final     LDL Cholesterol   Date Value Ref Range Status   06/26/2023 59.00 50.00 - 140.00 mg/dL Final     Vitamin D   Date Value Ref Range Status   06/26/2023 23.9 (L) 30.0 - 80.0 ng/mL Final     Results for orders placed or performed in visit on 06/26/23   CD4 Lymphocytes   Result Value Ref Range    Patient Age 58     WBC Absolute 8,640 4,500 - 11,500 /mm3    Lymph Percent 24 (L) 28 - 48 %    Lymph Absolute 2,073.6 1,260 - 5,520 x10(3)/mcL    CD4 % 15.9 %    CD4 Absolute 329.7024 (L) 589 - 1,505 unit/L    T Cell Interp       Normal absolute lymphocyte count with decreased absolute CD4+ lymphocyte count.    French Dixon M.D.        Results for orders placed or performed in visit on 08/21/24   HIV-1 RNA, Quantitative, PCR with Reflex to Genotype   Result Value Ref Range    HIV-1 RNA Detect/Quant, P <20 (A) Undetected copies/mL     Results for orders placed or performed in visit on 06/26/23   Quantiferon Gold TB   Result Value Ref Range    QuantiFERON-Tb Gold Plus Result Negative Negative    TB1 Ag minus Nil Result 0.01 IU/mL    TB2 Ag minus Nil Result 0.02 IU/mL    Mitogen minus Nil Result >10.00 IU/mL    Nil Result 0.02 IU/mL     No results found for this or any previous visit.  Results for orders placed or performed in visit on 05/01/24   Urinalysis   Result Value Ref Range    Color, UA Colorless (A) Yellow, Light-Yellow, Dark Yellow, Danielle, Straw    Appearance, UA Clear Clear    Specific Gravity, UA 1.012 1.005 - 1.030    pH, UA 5.5 5.0 - 8.5    Protein, UA 1+ (A) Negative    Glucose, UA Normal  Negative, Normal    Ketones, UA Negative Negative    Blood, UA Trace (A) Negative    Bilirubin, UA Negative Negative    Urobilinogen, UA Normal 0.2, 1.0, Normal    Nitrites, UA Negative Negative    Leukocyte Esterase, UA 25 (A) Negative    WBC, UA 6-10 (A) None Seen, 0-2, 3-5, 0-5 /HPF    Bacteria, UA None Seen None Seen /HPF    Squamous Epithelial Cells, UA Trace (A) None Seen /HPF    Hyaline Casts, UA None Seen None Seen /lpf    RBC, UA 0-5 None Seen, 0-2, 3-5, 0-5 /HPF       Imaging: Reviewed most recent relevant imaging studies available, notable results highlighted in this note    Medications:     Current Outpatient Medications   Medication Instructions    allopurinoL (ZYLOPRIM) 100 MG tablet TAKE one-half (50 MG) tablet BY MOUTH EVERY DAY    amLODIPine (NORVASC) 10 mg, Oral    atorvastatin (LIPITOR) 20 mg, Oral, Daily    ferrous sulfate (FEOSOL) 325 mg, Oral, Daily, On an empty stomach.    furosemide (LASIX) 40 mg, Oral, Daily    metoprolol succinate (TOPROL-XL) 25 mg, Oral, Daily    PREZCOBIX 800-150 mg-mg Tab tablet 1 tablet, Oral, Daily    sildenafil (REVATIO) 20 mg, Oral, Daily PRN    sodium zirconium cyclosilicate (LOKELMA) 10 g, Oral, 3 times daily, For 48 hours, then take one packet every day. Mix entire contents of packet(s) into drinking glass containing 3 tablespoons of water; stir well and drink immediately. Add water and repeat until no powder remains to receive entire dose.    TIVICAY 50 mg, Oral, Daily       Assessment:       Problem List Items Addressed This Visit    None  Visit Diagnoses       HIV disease    -  Primary    Relevant Medications    PREZCOBIX 800-150 mg-mg Tab tablet    TIVICAY 50 mg Tab    Other Relevant Orders    CD4 Lymphocytes    CBC Auto Differential    Comprehensive Metabolic Panel    HIV-1 RNA, Quantitative, PCR with Reflex to Genotype    Cough productive of yellow sputum        Relevant Orders    Respiratory Culture    X-Ray Chest PA And Lateral    Syphilis (acquired)         Relevant Medications    penicillin G benzathine (BICILLIN LA) injection 2.4 Million Units    Other Relevant Orders    SYPHILIS ANTIBODY (WITH REFLEX RPR)               Plan:      HIV disease  -     PREZCOBIX 800-150 mg-mg Tab tablet; Take 1 tablet by mouth once daily.  Dispense: 90 tablet; Refill: 1  -     TIVICAY 50 mg Tab; Take 1 tablet (50 mg total) by mouth once daily.  Dispense: 90 tablet; Refill: 1  -     CD4 Lymphocytes; Future; Expected date: 12/12/2024  -     CBC Auto Differential; Future; Expected date: 12/12/2024  -     Comprehensive Metabolic Panel; Future; Expected date: 12/12/2024  -     HIV-1 RNA, Quantitative, PCR with Reflex to Genotype; Future; Expected date: 12/12/2024  Diagnosed 1992.  CD4 Chris 35 (7/09)  ART History: Kaletra, Isentress, Combivir  Changed to Combivir, Prezista, Norvir, Tivicay 11/2016 for simplification. Revised to Edurant, Prezcobix, Tivicay 12/17 due to NRTI class resistance.  Available Historic Genotypes scanned into chart 5/1/24.     Adherence and sexual health counseling done.  Use condoms for all sexual encounters.  Blood precautions.   ContinueTivicay 50 mg daily & Prezcobix 1 tab daily as prescribed.  Labs today.  RTC 3 months with Geov[facility].    Cough productive of yellow sputum  -     Respiratory Culture; Future; Expected date: 12/12/2024  -     X-Ray Chest PA And Lateral; Future; Expected date: 12/12/2024  CXR negative.   Sputum collected for culture.   Robitussin & Flonase OTC.     Syphilis (acquired)  -     penicillin G benzathine (BICILLIN LA) injection 2.4 Million Units  -     SYPHILIS ANTIBODY (WITH REFLEX RPR); Future; Expected date: 12/12/2024  Repeat titer today for new baseline.   Bicillin 2.4 mil units IM today.     Hyperkalemia  Labs today.   Continue Lokelma 1 pkt per day.   Continue Lasix 40 mg daily as previously prescribed by ED provider.      ESRD (end stage renal disease)  -     Comprehensive Metabolic Panel; Future; Expected date: 08/21/2024  -      CBC Auto Differential; Future; Expected date: 08/21/2024  Declines any further nephrology evaluation or intervention at this juncture.   Will notify me if he should change decision.   Accepts eventual fatal consequence of this choice.   Pt's decision respected.

## 2024-12-14 LAB
BACTERIA SPEC CULT: NORMAL
GRAM STN SPEC: NORMAL

## 2025-07-08 DIAGNOSIS — E78.5 HYPERLIPIDEMIA, UNSPECIFIED HYPERLIPIDEMIA TYPE: ICD-10-CM

## 2025-07-08 DIAGNOSIS — I10 ESSENTIAL (PRIMARY) HYPERTENSION: ICD-10-CM

## 2025-07-08 RX ORDER — AMLODIPINE BESYLATE 10 MG/1
10 TABLET ORAL
Qty: 90 TABLET | Refills: 0 | Status: SHIPPED | OUTPATIENT
Start: 2025-07-08

## 2025-07-08 RX ORDER — METOPROLOL SUCCINATE 25 MG/1
25 TABLET, EXTENDED RELEASE ORAL
Qty: 90 TABLET | Refills: 0 | Status: SHIPPED | OUTPATIENT
Start: 2025-07-08

## 2025-07-08 RX ORDER — ATORVASTATIN CALCIUM 20 MG/1
20 TABLET, FILM COATED ORAL
Qty: 90 TABLET | Refills: 0 | Status: SHIPPED | OUTPATIENT
Start: 2025-07-08

## 2025-07-21 DIAGNOSIS — E87.5 HYPERKALEMIA: ICD-10-CM

## 2025-07-21 RX ORDER — SODIUM ZIRCONIUM CYCLOSILICATE 10 G/10G
POWDER, FOR SUSPENSION ORAL
Qty: 36 PACKET | Refills: 0 | Status: SHIPPED | OUTPATIENT
Start: 2025-07-21